# Patient Record
Sex: FEMALE | Race: WHITE | HISPANIC OR LATINO | Employment: FULL TIME | URBAN - METROPOLITAN AREA
[De-identification: names, ages, dates, MRNs, and addresses within clinical notes are randomized per-mention and may not be internally consistent; named-entity substitution may affect disease eponyms.]

---
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
## 2022-04-21 ENCOUNTER — HOSPITAL ENCOUNTER (OUTPATIENT)
Dept: LAB | Facility: MEDICAL CENTER | Age: 22
End: 2022-04-21
Attending: FAMILY MEDICINE
Payer: OTHER GOVERNMENT

## 2022-04-21 LAB — B-HCG SERPL-ACNC: <1 MIU/ML (ref 0–5)

## 2022-04-21 PROCEDURE — 36415 COLL VENOUS BLD VENIPUNCTURE: CPT

## 2022-04-21 PROCEDURE — 84702 CHORIONIC GONADOTROPIN TEST: CPT

## 2022-05-04 ENCOUNTER — HOSPITAL ENCOUNTER (EMERGENCY)
Facility: MEDICAL CENTER | Age: 22
End: 2022-05-04
Attending: STUDENT IN AN ORGANIZED HEALTH CARE EDUCATION/TRAINING PROGRAM
Payer: OTHER GOVERNMENT

## 2022-05-04 VITALS
RESPIRATION RATE: 16 BRPM | SYSTOLIC BLOOD PRESSURE: 127 MMHG | BODY MASS INDEX: 32.05 KG/M2 | DIASTOLIC BLOOD PRESSURE: 69 MMHG | TEMPERATURE: 97.9 F | OXYGEN SATURATION: 98 % | HEART RATE: 86 BPM | WEIGHT: 174.16 LBS | HEIGHT: 62 IN

## 2022-05-04 DIAGNOSIS — N93.9 VAGINAL BLEEDING: ICD-10-CM

## 2022-05-04 LAB
ALBUMIN SERPL BCP-MCNC: 4.6 G/DL (ref 3.2–4.9)
ALBUMIN/GLOB SERPL: 1.4 G/DL
ALP SERPL-CCNC: 86 U/L (ref 30–99)
ALT SERPL-CCNC: 11 U/L (ref 2–50)
ANION GAP SERPL CALC-SCNC: 11 MMOL/L (ref 7–16)
AST SERPL-CCNC: 12 U/L (ref 12–45)
BASOPHILS # BLD AUTO: 0.3 % (ref 0–1.8)
BASOPHILS # BLD: 0.02 K/UL (ref 0–0.12)
BILIRUB SERPL-MCNC: 0.3 MG/DL (ref 0.1–1.5)
BUN SERPL-MCNC: 12 MG/DL (ref 8–22)
CALCIUM SERPL-MCNC: 9.4 MG/DL (ref 8.5–10.5)
CHLORIDE SERPL-SCNC: 105 MMOL/L (ref 96–112)
CO2 SERPL-SCNC: 23 MMOL/L (ref 20–33)
CREAT SERPL-MCNC: 0.59 MG/DL (ref 0.5–1.4)
EOSINOPHIL # BLD AUTO: 0.07 K/UL (ref 0–0.51)
EOSINOPHIL NFR BLD: 0.9 % (ref 0–6.9)
ERYTHROCYTE [DISTWIDTH] IN BLOOD BY AUTOMATED COUNT: 42.3 FL (ref 35.9–50)
GFR SERPLBLD CREATININE-BSD FMLA CKD-EPI: 132 ML/MIN/1.73 M 2
GLOBULIN SER CALC-MCNC: 3.2 G/DL (ref 1.9–3.5)
GLUCOSE SERPL-MCNC: 92 MG/DL (ref 65–99)
HCG SERPL QL: NEGATIVE
HCT VFR BLD AUTO: 42.9 % (ref 37–47)
HGB BLD-MCNC: 13.7 G/DL (ref 12–16)
IMM GRANULOCYTES # BLD AUTO: 0.02 K/UL (ref 0–0.11)
IMM GRANULOCYTES NFR BLD AUTO: 0.3 % (ref 0–0.9)
LYMPHOCYTES # BLD AUTO: 2.62 K/UL (ref 1–4.8)
LYMPHOCYTES NFR BLD: 34.2 % (ref 22–41)
MCH RBC QN AUTO: 25.7 PG (ref 27–33)
MCHC RBC AUTO-ENTMCNC: 31.9 G/DL (ref 33.6–35)
MCV RBC AUTO: 80.5 FL (ref 81.4–97.8)
MONOCYTES # BLD AUTO: 0.5 K/UL (ref 0–0.85)
MONOCYTES NFR BLD AUTO: 6.5 % (ref 0–13.4)
NEUTROPHILS # BLD AUTO: 4.42 K/UL (ref 2–7.15)
NEUTROPHILS NFR BLD: 57.8 % (ref 44–72)
NRBC # BLD AUTO: 0 K/UL
NRBC BLD-RTO: 0 /100 WBC
PLATELET # BLD AUTO: 362 K/UL (ref 164–446)
PMV BLD AUTO: 9.2 FL (ref 9–12.9)
POTASSIUM SERPL-SCNC: 4.4 MMOL/L (ref 3.6–5.5)
PROT SERPL-MCNC: 7.8 G/DL (ref 6–8.2)
RBC # BLD AUTO: 5.33 M/UL (ref 4.2–5.4)
SODIUM SERPL-SCNC: 139 MMOL/L (ref 135–145)
WBC # BLD AUTO: 7.7 K/UL (ref 4.8–10.8)

## 2022-05-04 PROCEDURE — 99284 EMERGENCY DEPT VISIT MOD MDM: CPT

## 2022-05-04 PROCEDURE — 85025 COMPLETE CBC W/AUTO DIFF WBC: CPT

## 2022-05-04 PROCEDURE — 80053 COMPREHEN METABOLIC PANEL: CPT

## 2022-05-04 PROCEDURE — 84703 CHORIONIC GONADOTROPIN ASSAY: CPT

## 2022-05-04 PROCEDURE — 36415 COLL VENOUS BLD VENIPUNCTURE: CPT

## 2022-05-04 ASSESSMENT — FIBROSIS 4 INDEX: FIB4 SCORE: 0.22

## 2022-05-05 NOTE — ED TRIAGE NOTES
"Chief Complaint   Patient presents with   • Vaginal Bleeding     Pt had a positive at home pregnancy test today. Pt had spotting this morning.     /79   Pulse 92   Temp 36.7 °C (98.1 °F) (Temporal)   Resp 16   Ht 1.575 m (5' 2\")   Wt 79 kg (174 lb 2.6 oz)   SpO2 96%   BMI 31.85 kg/m²     "

## 2022-05-05 NOTE — ED PROVIDER NOTES
"CHIEF COMPLAINT  Chief Complaint   Patient presents with   • Vaginal Bleeding     Pt had a positive at home pregnancy test today. Pt had spotting this morning.       HPI  Constantin Bobo is a 21 y.o. female who presents for evaluation of vaginal spotting.  Patient states that her last menstrual cycle was March 15.  Today she noted some light vaginal spotting after wiping, took a home pregnancy test which was positive.  Stated that she has been pregnant 1 time before and did have bleeding throughout her pregnancy, so she presented to the emergency department for evaluation.  Denies any heavy vaginal bleeding.  Denies any pelvic pain.  Dizziness lightheadedness or syncopal episodes.  No concerns for STDs.  Denies any dysuria increased urinary frequency or hematuria.  REVIEW OF SYSTEMS  See HPI for further details. All other systems are negative.     PAST MEDICAL HISTORY   has a past medical history of Mild intermittent asthma (11/16/2015) and Myopia of both eyes (11/16/2015).    SOCIAL HISTORY  Social History     Tobacco Use   • Smoking status: Never Smoker   • Smokeless tobacco: Never Used   Vaping Use   • Vaping Use: Never used   Substance and Sexual Activity   • Alcohol use: No     Alcohol/week: 0.0 oz   • Drug use: No     Comment: marijuana last use 7/5/19   • Sexual activity: Never       SURGICAL HISTORY  patient denies any surgical history    CURRENT MEDICATIONS  Home Medications     Reviewed by Jeremy Peterson R.N. (Registered Nurse) on 05/04/22 at 0153  Med List Status: Partial   Medication Last Dose Status   APRI 0.15-30 MG-MCG per tablet  Active   famotidine (PEPCID) 20 MG Tab  Active   ondansetron (ZOFRAN ODT) 4 MG TABLET DISPERSIBLE  Active   sucralfate (CARAFATE) 1 GM Tab  Active                ALLERGIES  No Known Allergies    FAMILY HISTORY  No pertinent family history    PHYSICAL EXAM   /79   Pulse 92   Temp 36.7 °C (98.1 °F) (Temporal)   Resp 16   Ht 1.575 m (5' 2\")   Wt 79 kg (174 " lb 2.6 oz)   SpO2 96%   BMI 31.85 kg/m²  @[id number]::@   Pulse ox interpretation: I interpret this pulse ox as normal.  VITALS - vital signs documented prior to this note have been reviewed and noted,  GENERAL - awake, alert, oriented, GCS 15, no apparent distress, non-toxic  appearing  HEENT - normocephalic, atraumatic, pupils equal, sclera anicteric, mucus  membranes moist  NECK - supple, no meningismus, full active range of motion, trachea midline  CARDIOVASCULAR - regular rate/rhythm, no murmurs/gallops/rubs  PULMONARY - no respiratory distress, speaking in full sentences, clear to  auscultation bilaterally, no wheezing/ronchi/rales, no accessory muscle use  GASTROINTESTINAL - soft, non-tender, non-distended, no rebound, guarding,  or peritonitis  GENITOURINARY - patient declined pelvic exam  NEUROLOGIC - Awake alert, normal mental status, speech fluid, cognition  normal, moves all extremities  MUSCULOSKELETAL - no obvious asymmetry or deformities present  EXTREMITIES - warm, well-perfused, no cyanosis or significant edema  DERMATOLOGIC - warm, dry, no rashes, no jaundice  PSYCHIATRIC - normal affect, normal insight, normal concentration            LABS  Labs Reviewed   CBC WITH DIFFERENTIAL - Abnormal; Notable for the following components:       Result Value    MCV 80.5 (*)     MCH 25.7 (*)     MCHC 31.9 (*)     All other components within normal limits   COMP METABOLIC PANEL   HCG QUAL SERUM   ESTIMATED GFR       Results for orders placed or performed during the hospital encounter of 05/04/22   CBC WITH DIFFERENTIAL   Result Value Ref Range    WBC 7.7 4.8 - 10.8 K/uL    RBC 5.33 4.20 - 5.40 M/uL    Hemoglobin 13.7 12.0 - 16.0 g/dL    Hematocrit 42.9 37.0 - 47.0 %    MCV 80.5 (L) 81.4 - 97.8 fL    MCH 25.7 (L) 27.0 - 33.0 pg    MCHC 31.9 (L) 33.6 - 35.0 g/dL    RDW 42.3 35.9 - 50.0 fL    Platelet Count 362 164 - 446 K/uL    MPV 9.2 9.0 - 12.9 fL    Neutrophils-Polys 57.80 44.00 - 72.00 %    Lymphocytes 34.20  22.00 - 41.00 %    Monocytes 6.50 0.00 - 13.40 %    Eosinophils 0.90 0.00 - 6.90 %    Basophils 0.30 0.00 - 1.80 %    Immature Granulocytes 0.30 0.00 - 0.90 %    Nucleated RBC 0.00 /100 WBC    Neutrophils (Absolute) 4.42 2.00 - 7.15 K/uL    Lymphs (Absolute) 2.62 1.00 - 4.80 K/uL    Monos (Absolute) 0.50 0.00 - 0.85 K/uL    Eos (Absolute) 0.07 0.00 - 0.51 K/uL    Baso (Absolute) 0.02 0.00 - 0.12 K/uL    Immature Granulocytes (abs) 0.02 0.00 - 0.11 K/uL    NRBC (Absolute) 0.00 K/uL   COMP METABOLIC PANEL   Result Value Ref Range    Sodium 139 135 - 145 mmol/L    Potassium 4.4 3.6 - 5.5 mmol/L    Chloride 105 96 - 112 mmol/L    Co2 23 20 - 33 mmol/L    Anion Gap 11.0 7.0 - 16.0    Glucose 92 65 - 99 mg/dL    Bun 12 8 - 22 mg/dL    Creatinine 0.59 0.50 - 1.40 mg/dL    Calcium 9.4 8.5 - 10.5 mg/dL    AST(SGOT) 12 12 - 45 U/L    ALT(SGPT) 11 2 - 50 U/L    Alkaline Phosphatase 86 30 - 99 U/L    Total Bilirubin 0.3 0.1 - 1.5 mg/dL    Albumin 4.6 3.2 - 4.9 g/dL    Total Protein 7.8 6.0 - 8.2 g/dL    Globulin 3.2 1.9 - 3.5 g/dL    A-G Ratio 1.4 g/dL   HCG QUAL SERUM   Result Value Ref Range    Beta-Hcg Qualitative Serum Negative Negative   ESTIMATED GFR   Result Value Ref Range    GFR (CKD-EPI) 132 >60 mL/min/1.73 m 2         Pertinent Labs & Imaging studies reviewed. (See chart for details)    RADIOLOGY  No orders to display             ED COURSE             Medications - No data to display            MEDICAL DECISION MAKING    Patient presented for evaluation of concerns of her light vaginal spotting.  Differential initial included was not limited to threatened  and noted  missed  ectopic pregnancy, cervicitis, menorrhagia dysfunctional uterine bleeding among other considerations.  Labs were obtained were nonactionable hCG is negative.  Patient has no pelvic pain.  Given that her hCG is negative she has no pelvic pain do not see an indication for an emergent ultrasound at this time.  Patient  declined pelvic exam in the emergency department.  Given that she is otherwise hemodynamically stable her labs are nonactionable I do believe she is appropriate for for outpatient management.  Unclear exact etiology of her at home positive pregnancy test and negative hCG today thus I recommended close follow-up with OB gynecology.  All pertinent return precautions were discussed with the patient, and they expressed understanding.  Patient was discharged in a stable condition              FINAL IMPRESSION  1.  Dysfunctional uterine bleeding         Electronically signed by: Nicholas Dowell D.O., 5/4/2022 8:26 PM      Dictation Disclaimer  Please note this report has been produced using speech recognition software and  may contain errors related to that system, including errors seen in grammar,  punctuation and spelling, as well as words and phrases that may be inappropriate.  If there are any questions or concerns, please feel free to contact the dictating  physician for clarification.

## 2022-05-05 NOTE — DISCHARGE INSTRUCTIONS
If you develop any severe abdominal pain increased vaginal bleeding, going through more than 1 pad an hour for 3 hours in order return with a recheck, follow-up with OB gynecology.  Return with other concerns

## 2022-08-16 ENCOUNTER — APPOINTMENT (OUTPATIENT)
Dept: MEDICAL GROUP | Facility: MEDICAL CENTER | Age: 22
End: 2022-08-16
Payer: OTHER GOVERNMENT

## 2022-08-16 ENCOUNTER — OFFICE VISIT (OUTPATIENT)
Dept: URGENT CARE | Facility: CLINIC | Age: 22
End: 2022-08-16
Payer: OTHER GOVERNMENT

## 2022-08-16 VITALS
OXYGEN SATURATION: 98 % | TEMPERATURE: 98.3 F | SYSTOLIC BLOOD PRESSURE: 110 MMHG | HEART RATE: 68 BPM | WEIGHT: 180 LBS | BODY MASS INDEX: 33.13 KG/M2 | RESPIRATION RATE: 16 BRPM | DIASTOLIC BLOOD PRESSURE: 60 MMHG | HEIGHT: 62 IN

## 2022-08-16 DIAGNOSIS — N93.9 VAGINAL SPOTTING: ICD-10-CM

## 2022-08-16 PROCEDURE — 99213 OFFICE O/P EST LOW 20 MIN: CPT | Performed by: PHYSICIAN ASSISTANT

## 2022-08-16 PROCEDURE — 81025 URINE PREGNANCY TEST: CPT | Performed by: PHYSICIAN ASSISTANT

## 2022-08-16 ASSESSMENT — ENCOUNTER SYMPTOMS
HEADACHES: 0
NAUSEA: 0
VOMITING: 0
CHILLS: 0
ABDOMINAL PAIN: 0
DIZZINESS: 0
FEVER: 0

## 2022-08-16 ASSESSMENT — FIBROSIS 4 INDEX: FIB4 SCORE: 0.2

## 2022-08-16 NOTE — PROGRESS NOTES
"Subjective     Constantin Bobo is a 22 y.o. female who presents with Vaginal Bleeding (X 5 days)            The patient is here concerned with vaginal spotting. The patient had a miscarriage about 3 months ago. She reports having 2 regular periods. Her last period with 8/2/2022. She finished her cycle and noticed light spotting about 5 days ago. She states the bleeding is brown and does not saturate a panty-liner. She denies any vaginal discharge. She is sexually active and not concerned about STIs. She denies abnormal pelvic pain or cramping. She states she reached out to her PCP who advised her to be seen in the ED. The patient also reports recent weight gain and stress.     Past Medical History:   Diagnosis Date    Mild intermittent asthma 11/16/2015    Myopia of both eyes 11/16/2015     No past surgical history on file.    No family history on file.    No Known Allergies  Medications, Allergies, and current problem list reviewed today in Epic      Review of Systems   Constitutional:  Negative for chills, fever and malaise/fatigue.   Gastrointestinal:  Negative for abdominal pain, nausea and vomiting.   Genitourinary:         Vaginal spotting. No pelvic pain    Skin:  Negative for itching and rash.   Neurological:  Negative for dizziness and headaches.      All other systems reviewed and are negative.         Objective     /60 (BP Location: Left arm, Patient Position: Sitting, BP Cuff Size: Adult long)   Pulse 68   Temp 36.8 °C (98.3 °F) (Temporal)   Resp 16   Ht 1.575 m (5' 2\")   Wt 81.6 kg (180 lb)   LMP 08/01/2022   SpO2 98%   BMI 32.92 kg/m²      Physical Exam  Constitutional:       General: She is not in acute distress.     Appearance: She is not ill-appearing.   HENT:      Head: Normocephalic and atraumatic.   Eyes:      Conjunctiva/sclera: Conjunctivae normal.   Cardiovascular:      Rate and Rhythm: Normal rate and regular rhythm.   Pulmonary:      Effort: Pulmonary effort is normal. " No respiratory distress.      Breath sounds: No stridor. No wheezing.   Neurological:      General: No focal deficit present.      Mental Status: She is alert and oriented to person, place, and time.   Psychiatric:         Mood and Affect: Mood normal.         Behavior: Behavior normal.         Thought Content: Thought content normal.         Judgment: Judgment normal.                HCG- negative            Assessment & Plan        1. Vaginal spotting  POCT Pregnancy        Likely hormonal- recent miscarriage, stress and weight gain.  No red flags- no pelvic pain, heavy bleeding, discharge.  Recommend follow-up with Gynecology as planned in 3 weeks for more extensive work-up.  Differential diagnoses, Supportive care, and indications for immediate follow-up discussed with patient.   Pathogenesis of diagnosis discussed including typical length and natural progression.   Instructed to return to clinic or nearest emergency department for any change in condition, further concerns, or worsening of symptoms.      The patient demonstrated a good understanding and agreed with the treatment plan.      Huyen Hilton P.A.-C.

## 2022-08-16 NOTE — LETTER
August 16, 2022         Patient: Constantin Bobo   YOB: 2000   Date of Visit: 8/16/2022           To Whom it May Concern:    Constantin Bobo was seen in my clinic on 8/16/2022. She should be excused from work today for medical reasons.    If you have any questions or concerns, please don't hesitate to call.        Sincerely,           Huyen Hilton P.A.-C.  Electronically Signed

## 2022-08-17 LAB
INT CON NEG: NEGATIVE
INT CON POS: POSITIVE
POC URINE PREGNANCY TEST: NEGATIVE

## 2022-08-26 ENCOUNTER — APPOINTMENT (OUTPATIENT)
Dept: MEDICAL GROUP | Facility: MEDICAL CENTER | Age: 22
End: 2022-08-26
Payer: OTHER GOVERNMENT

## 2023-01-25 ENCOUNTER — TELEMEDICINE (OUTPATIENT)
Dept: MEDICAL GROUP | Facility: MEDICAL CENTER | Age: 23
End: 2023-01-25
Payer: OTHER GOVERNMENT

## 2023-01-25 VITALS — WEIGHT: 160 LBS | HEIGHT: 62 IN | BODY MASS INDEX: 29.44 KG/M2

## 2023-01-25 DIAGNOSIS — J01.01 ACUTE RECURRENT MAXILLARY SINUSITIS: ICD-10-CM

## 2023-01-25 PROBLEM — R09.81 SINUS CONGESTION: Status: ACTIVE | Noted: 2023-01-25

## 2023-01-25 PROCEDURE — 99213 OFFICE O/P EST LOW 20 MIN: CPT | Mod: 95 | Performed by: STUDENT IN AN ORGANIZED HEALTH CARE EDUCATION/TRAINING PROGRAM

## 2023-01-25 RX ORDER — ALBUTEROL SULFATE 90 UG/1
AEROSOL, METERED RESPIRATORY (INHALATION)
COMMUNITY
Start: 2022-12-13

## 2023-01-25 RX ORDER — AMOXICILLIN AND CLAVULANATE POTASSIUM 875; 125 MG/1; MG/1
1 TABLET, FILM COATED ORAL 2 TIMES DAILY
Qty: 14 TABLET | Refills: 0 | Status: SHIPPED | OUTPATIENT
Start: 2023-01-25 | End: 2023-02-01

## 2023-01-25 ASSESSMENT — FIBROSIS 4 INDEX: FIB4 SCORE: 0.2

## 2023-01-25 NOTE — PROGRESS NOTES
"Virtual Visit: Established Patient   This visit was conducted via Zoom using secure and encrypted videoconferencing technology.   The patient was in their home in the [facility].    The patient's identity was confirmed and verbal consent was obtained for this virtual visit.    Subjective:   CC:   Chief Complaint   Patient presents with    Sinus Problem     Congestion, sneezing, watery eyes     Constantin Bobo is a 22 y.o. female presenting for evaluation and management of:    Problem   Acute Recurrent Maxillary Sinusitis    Acute condition. She reports she does get sinus infections regularly. She has taken and tolerated amoxicillin in the past.    Date of symptoms onset: several days  Symptoms: sinus tenderness, congested, runny nose  Denies fever, cough, chest pain, shortness of breath.  Date of COVID positive test:  Medications tried: OTC sinus rinsing and Afrin with minimal relief  Home max temperature: no fever         ROS   See HPI    Current medicines (including changes today)  Current Outpatient Medications   Medication Sig Dispense Refill    amoxicillin-clavulanate (AUGMENTIN) 875-125 MG Tab Take 1 Tablet by mouth 2 times a day for 7 days. 14 Tablet 0    albuterol 108 (90 Base) MCG/ACT Aero Soln inhalation aerosol INHALE 2 PUFFS BY MOUTH TWICE A DAY AS NEEDED FOR WHEEZING       No current facility-administered medications for this visit.       Patient Active Problem List    Diagnosis Date Noted    Acute recurrent maxillary sinusitis 01/25/2023    Depression 09/30/2020    Microcytic anemia 09/30/2020    IUP (intrauterine pregnancy), incidental 12/18/2019    Mild intermittent asthma 11/16/2015    Myopia of both eyes 11/16/2015        Objective:   Ht 1.575 m (5' 2\")   Wt 72.6 kg (160 lb) Comment: pt stated  BMI 29.26 kg/m²     Physical Exam: limited exam due to virtual visit  Constitutional: Alert, no distress, well-groomed.  Respiratory: Unlabored respiratory effort  Neuro: Alert and oriented " x3, normal conversation.     Assessment and Plan:   The following treatment plan was discussed:     1. Acute recurrent maxillary sinusitis  Acute condition, persistent. Rx Augmentin per order.  - cont symptomatic therapy as needed: OTC ibuprofen as needed for pain/headache/fever, antihistamine/decongestant as needed for runny nose/congestion. Call or return to clinic prn if these symptoms worsen or fail to improve as anticipated.  - cont nasal saline rinsing daily  - amoxicillin-clavulanate (AUGMENTIN) 875-125 MG Tab; Take 1 Tablet by mouth 2 times a day for 7 days.  Dispense: 14 Tablet; Refill: 0      Follow-up: Return if symptoms worsen or fail to improve.

## 2023-01-26 DIAGNOSIS — J01.01 ACUTE RECURRENT MAXILLARY SINUSITIS: ICD-10-CM

## 2023-01-26 RX ORDER — DOXYCYCLINE HYCLATE 100 MG
100 TABLET ORAL 2 TIMES DAILY
Qty: 14 TABLET | Refills: 0 | Status: SHIPPED | OUTPATIENT
Start: 2023-01-26 | End: 2023-02-02

## 2023-06-05 ENCOUNTER — HOSPITAL ENCOUNTER (EMERGENCY)
Facility: MEDICAL CENTER | Age: 23
End: 2023-06-05
Attending: EMERGENCY MEDICINE
Payer: OTHER GOVERNMENT

## 2023-06-05 ENCOUNTER — APPOINTMENT (OUTPATIENT)
Dept: RADIOLOGY | Facility: MEDICAL CENTER | Age: 23
End: 2023-06-05
Attending: EMERGENCY MEDICINE
Payer: OTHER GOVERNMENT

## 2023-06-05 VITALS
RESPIRATION RATE: 16 BRPM | HEIGHT: 62 IN | DIASTOLIC BLOOD PRESSURE: 57 MMHG | HEART RATE: 85 BPM | SYSTOLIC BLOOD PRESSURE: 100 MMHG | OXYGEN SATURATION: 97 % | BODY MASS INDEX: 35.05 KG/M2 | WEIGHT: 190.48 LBS | TEMPERATURE: 97.5 F

## 2023-06-05 DIAGNOSIS — O20.9 VAGINAL BLEEDING IN PREGNANCY, FIRST TRIMESTER: ICD-10-CM

## 2023-06-05 DIAGNOSIS — O20.0 THREATENED MISCARRIAGE: ICD-10-CM

## 2023-06-05 LAB
ALBUMIN SERPL BCP-MCNC: 4.3 G/DL (ref 3.2–4.9)
ALBUMIN/GLOB SERPL: 1.5 G/DL
ALP SERPL-CCNC: 66 U/L (ref 30–99)
ALT SERPL-CCNC: 22 U/L (ref 2–50)
ANION GAP SERPL CALC-SCNC: 13 MMOL/L (ref 7–16)
APPEARANCE UR: ABNORMAL
AST SERPL-CCNC: 20 U/L (ref 12–45)
B-HCG SERPL-ACNC: ABNORMAL MIU/ML (ref 0–5)
BACTERIA #/AREA URNS HPF: ABNORMAL /HPF
BASOPHILS # BLD AUTO: 0.2 % (ref 0–1.8)
BASOPHILS # BLD: 0.02 K/UL (ref 0–0.12)
BILIRUB SERPL-MCNC: 0.4 MG/DL (ref 0.1–1.5)
BILIRUB UR QL STRIP.AUTO: NEGATIVE
BUN SERPL-MCNC: 8 MG/DL (ref 8–22)
CALCIUM ALBUM COR SERPL-MCNC: 8.9 MG/DL (ref 8.5–10.5)
CALCIUM SERPL-MCNC: 9.1 MG/DL (ref 8.5–10.5)
CHLORIDE SERPL-SCNC: 108 MMOL/L (ref 96–112)
CO2 SERPL-SCNC: 20 MMOL/L (ref 20–33)
COLOR UR: ABNORMAL
CREAT SERPL-MCNC: 0.68 MG/DL (ref 0.5–1.4)
EOSINOPHIL # BLD AUTO: 0.18 K/UL (ref 0–0.51)
EOSINOPHIL NFR BLD: 2.2 % (ref 0–6.9)
EPI CELLS #/AREA URNS HPF: ABNORMAL /HPF
ERYTHROCYTE [DISTWIDTH] IN BLOOD BY AUTOMATED COUNT: 38.5 FL (ref 35.9–50)
GFR SERPLBLD CREATININE-BSD FMLA CKD-EPI: 127 ML/MIN/1.73 M 2
GLOBULIN SER CALC-MCNC: 2.8 G/DL (ref 1.9–3.5)
GLUCOSE SERPL-MCNC: 89 MG/DL (ref 65–99)
GLUCOSE UR STRIP.AUTO-MCNC: NEGATIVE MG/DL
HCG SERPL QL: POSITIVE
HCT VFR BLD AUTO: 43 % (ref 37–47)
HGB BLD-MCNC: 13.9 G/DL (ref 12–16)
HYALINE CASTS #/AREA URNS LPF: ABNORMAL /LPF
IMM GRANULOCYTES # BLD AUTO: 0.02 K/UL (ref 0–0.11)
IMM GRANULOCYTES NFR BLD AUTO: 0.2 % (ref 0–0.9)
KETONES UR STRIP.AUTO-MCNC: 40 MG/DL
LEUKOCYTE ESTERASE UR QL STRIP.AUTO: ABNORMAL
LIPASE SERPL-CCNC: 27 U/L (ref 11–82)
LYMPHOCYTES # BLD AUTO: 2.24 K/UL (ref 1–4.8)
LYMPHOCYTES NFR BLD: 27.2 % (ref 22–41)
MCH RBC QN AUTO: 26.6 PG (ref 27–33)
MCHC RBC AUTO-ENTMCNC: 32.3 G/DL (ref 32.2–35.5)
MCV RBC AUTO: 82.2 FL (ref 81.4–97.8)
MICRO URNS: ABNORMAL
MONOCYTES # BLD AUTO: 0.57 K/UL (ref 0–0.85)
MONOCYTES NFR BLD AUTO: 6.9 % (ref 0–13.4)
NEUTROPHILS # BLD AUTO: 5.2 K/UL (ref 1.82–7.42)
NEUTROPHILS NFR BLD: 63.3 % (ref 44–72)
NITRITE UR QL STRIP.AUTO: NEGATIVE
NRBC # BLD AUTO: 0 K/UL
NRBC BLD-RTO: 0 /100 WBC (ref 0–0.2)
PH UR STRIP.AUTO: 6 [PH] (ref 5–8)
PLATELET # BLD AUTO: 301 K/UL (ref 164–446)
PMV BLD AUTO: 9.6 FL (ref 9–12.9)
POTASSIUM SERPL-SCNC: 4.1 MMOL/L (ref 3.6–5.5)
PROT SERPL-MCNC: 7.1 G/DL (ref 6–8.2)
PROT UR QL STRIP: 30 MG/DL
RBC # BLD AUTO: 5.23 M/UL (ref 4.2–5.4)
RBC # URNS HPF: ABNORMAL /HPF
RBC UR QL AUTO: NEGATIVE
SODIUM SERPL-SCNC: 141 MMOL/L (ref 135–145)
SP GR UR STRIP.AUTO: 1.02
UROBILINOGEN UR STRIP.AUTO-MCNC: 1 MG/DL
WBC # BLD AUTO: 8.2 K/UL (ref 4.8–10.8)
WBC #/AREA URNS HPF: ABNORMAL /HPF

## 2023-06-05 PROCEDURE — 99284 EMERGENCY DEPT VISIT MOD MDM: CPT

## 2023-06-05 PROCEDURE — 81001 URINALYSIS AUTO W/SCOPE: CPT

## 2023-06-05 PROCEDURE — 76801 OB US < 14 WKS SINGLE FETUS: CPT

## 2023-06-05 PROCEDURE — 84703 CHORIONIC GONADOTROPIN ASSAY: CPT

## 2023-06-05 PROCEDURE — 36415 COLL VENOUS BLD VENIPUNCTURE: CPT

## 2023-06-05 PROCEDURE — 85025 COMPLETE CBC W/AUTO DIFF WBC: CPT

## 2023-06-05 PROCEDURE — 84702 CHORIONIC GONADOTROPIN TEST: CPT

## 2023-06-05 PROCEDURE — 83690 ASSAY OF LIPASE: CPT

## 2023-06-05 PROCEDURE — 80053 COMPREHEN METABOLIC PANEL: CPT

## 2023-06-05 ASSESSMENT — LIFESTYLE VARIABLES
DOES PATIENT WANT TO STOP DRINKING: NO
EVER FELT BAD OR GUILTY ABOUT YOUR DRINKING: NO
TOTAL SCORE: 0
TOTAL SCORE: 0
EVER HAD A DRINK FIRST THING IN THE MORNING TO STEADY YOUR NERVES TO GET RID OF A HANGOVER: NO
DO YOU DRINK ALCOHOL: NO
HAVE PEOPLE ANNOYED YOU BY CRITICIZING YOUR DRINKING: NO
HAVE YOU EVER FELT YOU SHOULD CUT DOWN ON YOUR DRINKING: NO
CONSUMPTION TOTAL: INCOMPLETE
TOTAL SCORE: 0

## 2023-06-05 ASSESSMENT — FIBROSIS 4 INDEX: FIB4 SCORE: 0.2

## 2023-06-05 ASSESSMENT — PAIN DESCRIPTION - PAIN TYPE: TYPE: ACUTE PAIN

## 2023-06-05 NOTE — ED NOTES
Assumed care of pt, pt resting without complaint at this time, friend at bedside. Pt pt updated with plan of care, waiting for U/S

## 2023-06-05 NOTE — ED NOTES
.Patient discharged in stable condition per orders. Wristband removed per protocol. Patient verbalized understanding of all discharge instructions. All belongings accounted for.

## 2023-06-05 NOTE — ED TRIAGE NOTES
22 yr patient walked in to triage for the above complaint. NAD per patient the bleeding started yesterday and she has had a miscarriage in the past and doesn't know if she is pregnant at this time. Explained the triage process and to inform staff if symptoms get worse.

## 2023-06-05 NOTE — ED PROVIDER NOTES
"  ER Provider Note    Scribed for Julio Marquez M.D. by Maria Eugenia Guo. 6/5/2023   6:39 AM    Primary Care Provider: Lottie Marshall M.D.    CHIEF COMPLAINT  Chief Complaint   Patient presents with    Vaginal Bleeding     Started this morning     EXTERNAL RECORDS REVIEWED  Outpatient Notes indicates that the patient was seen for vaginal bleeding and similar symptoms. and Outpatient labs & studies indicates the patient is Rh positive, so rhogam is not indicated.    HPI/ROS  LIMITATION TO HISTORY   Select: : None  OUTSIDE HISTORIAN(S):  Significant other at bedside to confirm sequence of events and collateral information as detailed below.    Constantin Bobo is a 22 y.o. female who presents to the ED for evaluation of vaginal bleeding onset yesterday. The patient states she also has associated abdominal cramping, back pain, and morning sickness, but denies any pain with urination. She reports she took a pregnancy a month ago and the result was positive. She adds that she was seen here a year ago for similar symptoms. No alleviating or exacerbating factors were noted.    PAST MEDICAL HISTORY  Past Medical History:   Diagnosis Date    Mild intermittent asthma 11/16/2015    Myopia of both eyes 11/16/2015     SURGICAL HISTORY  No past surgical history noted.    FAMILY HISTORY  No family history noted.    SOCIAL HISTORY   reports that she has never smoked. She has never used smokeless tobacco. She reports that she does not drink alcohol and does not use drugs.    CURRENT MEDICATIONS  Previous Medications    ALBUTEROL 108 (90 BASE) MCG/ACT AERO SOLN INHALATION AEROSOL    INHALE 2 PUFFS BY MOUTH TWICE A DAY AS NEEDED FOR WHEEZING     ALLERGIES  Allergies   Allergen Reactions    Seasonal Anxiety, Cough, Itching, Runny Nose and Shortness of Breath      PHYSICAL EXAM  /81   Pulse 97   Temp 36.4 °C (97.5 °F) (Temporal)   Resp 16   Ht 1.575 m (5' 2\")   Wt 86.4 kg (190 lb 7.6 oz)   LMP 04/14/2023 (Exact Date) "   SpO2 98%   BMI 34.84 kg/m²    Nursing note and vitals reviewed.  Constitutional: Well-developed and well-nourished. No distress.   HENT: Head is normocephalic and atraumatic. Oropharynx is clear and moist without exudate or erythema.   Eyes: Pupils are equal, round, and reactive to light. Conjunctiva are normal.   Cardiovascular: Normal rate and regular rhythm. No murmur heard. Normal radial pulses.  Pulmonary/Chest: Breath sounds normal. No wheezes or rales.   Abdominal: Soft, non-tender and non-distended. Normal active bowel sounds. No guarding or peritoneal signs. No palpable abdominal aortic aneurysm. No masses. No tenderness at McBurney's point.   Musculoskeletal: Extremities exhibit normal range of motion without edema or tenderness.   Neurological: Awake, alert and oriented to person, place, and time. No focal deficits noted.  Skin: Skin is warm and dry. No rash.  Psychiatric: Normal mood and affect. Appropriate for clinical situation    DIAGNOSTIC STUDIES    Labs:   Results for orders placed or performed during the hospital encounter of 06/05/23   CBC WITH DIFFERENTIAL   Result Value Ref Range    WBC 8.2 4.8 - 10.8 K/uL    RBC 5.23 4.20 - 5.40 M/uL    Hemoglobin 13.9 12.0 - 16.0 g/dL    Hematocrit 43.0 37.0 - 47.0 %    MCV 82.2 81.4 - 97.8 fL    MCH 26.6 (L) 27.0 - 33.0 pg    MCHC 32.3 32.2 - 35.5 g/dL    RDW 38.5 35.9 - 50.0 fL    Platelet Count 301 164 - 446 K/uL    MPV 9.6 9.0 - 12.9 fL    Neutrophils-Polys 63.30 44.00 - 72.00 %    Lymphocytes 27.20 22.00 - 41.00 %    Monocytes 6.90 0.00 - 13.40 %    Eosinophils 2.20 0.00 - 6.90 %    Basophils 0.20 0.00 - 1.80 %    Immature Granulocytes 0.20 0.00 - 0.90 %    Nucleated RBC 0.00 0.00 - 0.20 /100 WBC    Neutrophils (Absolute) 5.20 1.82 - 7.42 K/uL    Lymphs (Absolute) 2.24 1.00 - 4.80 K/uL    Monos (Absolute) 0.57 0.00 - 0.85 K/uL    Eos (Absolute) 0.18 0.00 - 0.51 K/uL    Baso (Absolute) 0.02 0.00 - 0.12 K/uL    Immature Granulocytes (abs) 0.02 0.00 -  0.11 K/uL    NRBC (Absolute) 0.00 K/uL   COMP METABOLIC PANEL   Result Value Ref Range    Sodium 141 135 - 145 mmol/L    Potassium 4.1 3.6 - 5.5 mmol/L    Chloride 108 96 - 112 mmol/L    Co2 20 20 - 33 mmol/L    Anion Gap 13.0 7.0 - 16.0    Glucose 89 65 - 99 mg/dL    Bun 8 8 - 22 mg/dL    Creatinine 0.68 0.50 - 1.40 mg/dL    Calcium 9.1 8.5 - 10.5 mg/dL    AST(SGOT) 20 12 - 45 U/L    ALT(SGPT) 22 2 - 50 U/L    Alkaline Phosphatase 66 30 - 99 U/L    Total Bilirubin 0.4 0.1 - 1.5 mg/dL    Albumin 4.3 3.2 - 4.9 g/dL    Total Protein 7.1 6.0 - 8.2 g/dL    Globulin 2.8 1.9 - 3.5 g/dL    A-G Ratio 1.5 g/dL   LIPASE   Result Value Ref Range    Lipase 27 11 - 82 U/L   HCG QUAL SERUM   Result Value Ref Range    Beta-Hcg Qualitative Serum Positive (A) Negative   URINALYSIS,CULTURE IF INDICATED    Specimen: Urine   Result Value Ref Range    Color DK Yellow     Character Cloudy (A)     Specific Gravity 1.025 <1.035    Ph 6.0 5.0 - 8.0    Glucose Negative Negative mg/dL    Ketones 40 (A) Negative mg/dL    Protein 30 (A) Negative mg/dL    Bilirubin Negative Negative    Urobilinogen, Urine 1.0 Negative    Nitrite Negative Negative    Leukocyte Esterase Moderate (A) Negative    Occult Blood Negative Negative    Micro Urine Req Microscopic    CORRECTED CALCIUM   Result Value Ref Range    Correct Calcium 8.9 8.5 - 10.5 mg/dL   ESTIMATED GFR   Result Value Ref Range    GFR (CKD-EPI) 127 >60 mL/min/1.73 m 2   URINE MICROSCOPIC (W/UA)   Result Value Ref Range    WBC 5-10 (A) /hpf    RBC 0-2 /hpf    Bacteria Moderate (A) None /hpf    Epithelial Cells Moderate (A) /hpf    Hyaline Cast 0-2 /lpf   HCG QUANTITATIVE SERUM   Result Value Ref Range    Bhcg 16827.0 (H) 0.0 - 5.0 mIU/mL     Radiology:   This attending emergency physician has independently interpreted the diagnostic imaging associated with this visit and is awaiting the final reading from the radiologist.   Preliminary interpretation is a follows: Ultrasound demonstrates single  live intrauterine pregnancy  Radiologist interpretation:   US-OB 1ST TRIMESTER WITH TRANSVAGINAL (COMBO)   Final Result      1.  Single living intrauterine pregnancy at 6 weeks, 3 days estimated gestational age.         INITIAL ASSESSMENT AND PLAN    6:39 AM - Patient was evaluated at bedside. Ordered for CBC w/ diff, CMP, lipase, HCG qual serum, and urinalysis to evaluate. Patient verbalizes understanding and support with my plan of care.  Differential diagnoses include but not limited to: miscarriage, threatened miscarriage, ectopic pregnancy and menses.    7:14 AM - Per nursing, the patient's pregnancy test today is positive. Ordered for US-OB 1st Trimester w/ Transvaginal and HCG Quant to evaluate. Review of electronic medical record indicates the patient is Rh positive, so rhogam is not indicated.    9:19 AM - Patient was reevaluated at bedside. Discussed lab and radiology results with the patient and informed them that she has a viable pregnancy currently at 6 weeks, but we cannot rule out miscarriage or threatened miscarriage. I referred the patient to gynecology. Patient will now be discharged at this time. Discussed return precautions and plan for at home care. Patient verbalizes understanding and agreement to this plan of care.      ED Observation Status? Yes; I am placing the patient in to an observation status due to a diagnostic uncertainty as well as therapeutic intensity. Patient placed in observation status at 6:44 AM, 6/5/2023.     Observation plan is as follows: Obtain labs, ultrasound, and monitor for signs of hemorrhage.    Upon Reevaluation, the patient's condition has: Improved; and will be discharged.    Patient discharged from ED Observation status at 9:19 AM (Time) 6/5/2023 (Date).         DISPOSITION AND DISCUSSIONS    I have discussed management of the patient with the following physicians and YARIEL's:  None    Discussion of management with other QHP or appropriate source(s): None      Escalation of care considered, and ultimately not performed: acute inpatient care management, however at this time, the patient is most appropriate for outpatient management.  However, the patient does not have evidence of an ectopic pregnancy and therefore is appropriate for discharge with outpatient follow-up.    Barriers to care at this time, including but not limited to:  None .     Decision tools and prescription drugs considered including, but not limited to:  None .    DISPOSITION:  Patient will be discharged home in stable condition.    FOLLOW UP:  Lottie Marshall M.D.  [address] [facility] [id number] [phone]    Schedule an appointment as soon as possible for a visit       [facility], Emergency Dept  [address] [facility] [address] [phone]    If symptoms worsen    Pregnancy Skylar Peterson M.D.  [address] [address]  [phone]    Schedule an appointment as soon as possible for a visit       FINAL DIANGOSIS  1. Threatened miscarriage    2. Vaginal bleeding in pregnancy, first trimester       Maria Eugenia REYES (Anaibapril), am scribing for, and in the presence of, Julio Marquez M.D..    Electronically signed by: Maria Eugenia Guo (Vitaliy), 6/5/2023    Julio REYES M.D. personally performed the services described in this documentation, as scribed by Maria Eugenia Guo in my presence, and it is both accurate and complete.      The note accurately reflects work and decisions made by me.  Julio Marquez M.D.  6/5/2023  1:18 PM

## 2023-08-04 ENCOUNTER — HOSPITAL ENCOUNTER (OUTPATIENT)
Facility: MEDICAL CENTER | Age: 23
End: 2023-08-04
Attending: OBSTETRICS & GYNECOLOGY
Payer: COMMERCIAL

## 2023-08-04 ENCOUNTER — HOSPITAL ENCOUNTER (OUTPATIENT)
Dept: LAB | Facility: MEDICAL CENTER | Age: 23
End: 2023-08-04
Attending: OBSTETRICS & GYNECOLOGY
Payer: COMMERCIAL

## 2023-08-04 LAB
AMBIGUOUS DTTM AMBI4: NORMAL
AMBIGUOUS DTTM AMBI4: NORMAL
APPEARANCE UR: CLEAR
BACTERIA #/AREA URNS HPF: ABNORMAL /HPF
BASOPHILS # BLD AUTO: 0.3 % (ref 0–1.8)
BASOPHILS # BLD: 0.02 K/UL (ref 0–0.12)
BILIRUB UR QL STRIP.AUTO: NEGATIVE
CAOX CRY #/AREA URNS HPF: ABNORMAL /HPF
COLOR UR: YELLOW
EOSINOPHIL # BLD AUTO: 0.1 K/UL (ref 0–0.51)
EOSINOPHIL NFR BLD: 1.4 % (ref 0–6.9)
EPI CELLS #/AREA URNS HPF: ABNORMAL /HPF
ERYTHROCYTE [DISTWIDTH] IN BLOOD BY AUTOMATED COUNT: 40.1 FL (ref 35.9–50)
GLUCOSE UR STRIP.AUTO-MCNC: NEGATIVE MG/DL
HBV SURFACE AG SER QL: ABNORMAL
HCT VFR BLD AUTO: 37.8 % (ref 37–47)
HCV AB SER QL: NORMAL
HGB BLD-MCNC: 12.2 G/DL (ref 12–16)
HIV 1+2 AB+HIV1 P24 AG SERPL QL IA: NORMAL
HYALINE CASTS #/AREA URNS LPF: ABNORMAL /LPF
IMM GRANULOCYTES # BLD AUTO: 0.04 K/UL (ref 0–0.11)
IMM GRANULOCYTES NFR BLD AUTO: 0.5 % (ref 0–0.9)
KETONES UR STRIP.AUTO-MCNC: ABNORMAL MG/DL
LEUKOCYTE ESTERASE UR QL STRIP.AUTO: ABNORMAL
LYMPHOCYTES # BLD AUTO: 1.71 K/UL (ref 1–4.8)
LYMPHOCYTES NFR BLD: 23.1 % (ref 22–41)
MCH RBC QN AUTO: 27.1 PG (ref 27–33)
MCHC RBC AUTO-ENTMCNC: 32.3 G/DL (ref 32.2–35.5)
MCV RBC AUTO: 83.8 FL (ref 81.4–97.8)
MICRO URNS: ABNORMAL
MONOCYTES # BLD AUTO: 0.49 K/UL (ref 0–0.85)
MONOCYTES NFR BLD AUTO: 6.6 % (ref 0–13.4)
MUCOUS THREADS #/AREA URNS HPF: ABNORMAL /HPF
NEUTROPHILS # BLD AUTO: 5.04 K/UL (ref 1.82–7.42)
NEUTROPHILS NFR BLD: 68.1 % (ref 44–72)
NITRITE UR QL STRIP.AUTO: NEGATIVE
NRBC # BLD AUTO: 0 K/UL
NRBC BLD-RTO: 0 /100 WBC (ref 0–0.2)
PH UR STRIP.AUTO: 6.5 [PH] (ref 5–8)
PLATELET # BLD AUTO: 244 K/UL (ref 164–446)
PMV BLD AUTO: 10.7 FL (ref 9–12.9)
PROT UR QL STRIP: NEGATIVE MG/DL
RBC # BLD AUTO: 4.51 M/UL (ref 4.2–5.4)
RBC # URNS HPF: ABNORMAL /HPF
RBC UR QL AUTO: NEGATIVE
RUBV AB SER QL: 1.51 IU/ML
SP GR UR STRIP.AUTO: 1.02
T PALLIDUM AB SER QL IA: ABNORMAL
UROBILINOGEN UR STRIP.AUTO-MCNC: 1 MG/DL
WBC # BLD AUTO: 7.4 K/UL (ref 4.8–10.8)
WBC #/AREA URNS HPF: ABNORMAL /HPF

## 2023-08-04 PROCEDURE — 86901 BLOOD TYPING SEROLOGIC RH(D): CPT

## 2023-08-04 PROCEDURE — 81420 FETAL CHRMOML ANEUPLOIDY: CPT

## 2023-08-04 PROCEDURE — 85025 COMPLETE CBC W/AUTO DIFF WBC: CPT

## 2023-08-04 PROCEDURE — 81001 URINALYSIS AUTO W/SCOPE: CPT

## 2023-08-04 PROCEDURE — 86762 RUBELLA ANTIBODY: CPT

## 2023-08-04 PROCEDURE — 87389 HIV-1 AG W/HIV-1&-2 AB AG IA: CPT

## 2023-08-04 PROCEDURE — 86592 SYPHILIS TEST NON-TREP QUAL: CPT

## 2023-08-04 PROCEDURE — 86780 TREPONEMA PALLIDUM: CPT

## 2023-08-04 PROCEDURE — 36415 COLL VENOUS BLD VENIPUNCTURE: CPT

## 2023-08-04 PROCEDURE — 86850 RBC ANTIBODY SCREEN: CPT

## 2023-08-04 PROCEDURE — 87340 HEPATITIS B SURFACE AG IA: CPT

## 2023-08-04 PROCEDURE — 86900 BLOOD TYPING SEROLOGIC ABO: CPT

## 2023-08-04 PROCEDURE — 86803 HEPATITIS C AB TEST: CPT

## 2023-08-05 LAB
ABO GROUP BLD: NORMAL
BLD GP AB SCN SERPL QL: NORMAL
RH BLD: NORMAL

## 2023-08-12 LAB
ANNOTATION COMMENT IMP: NORMAL
FET CHR X + Y ANEUP RISK PLAS.CFDNA QN: NORMAL
FET SEX US: NORMAL
FET TS 13 RISK PLAS.CFDNA QL: NORMAL
FET TS 18 RISK PLAS.CFDNA QL: NORMAL
FET TS 21 RISK PLAS.CFDNA QL: NORMAL
FETAL ANEUPLOIDY - TRIPLOIDY NV11651: NORMAL
FETAL FRACTION Q0204: 4.3 %
GA (DAYS): 2 D
GA (WEEKS): 15 WK
NUMBER OF FETUSES Q0671: 1
PATHOLOGY STUDY: NORMAL
REPORT FETAL SEX NL11652: YES

## 2023-09-29 ENCOUNTER — HOSPITAL ENCOUNTER (EMERGENCY)
Facility: MEDICAL CENTER | Age: 23
End: 2023-09-29
Attending: OBSTETRICS & GYNECOLOGY | Admitting: OBSTETRICS & GYNECOLOGY
Payer: COMMERCIAL

## 2023-09-29 VITALS
DIASTOLIC BLOOD PRESSURE: 63 MMHG | HEART RATE: 94 BPM | TEMPERATURE: 98.6 F | OXYGEN SATURATION: 96 % | RESPIRATION RATE: 18 BRPM | WEIGHT: 189 LBS | BODY MASS INDEX: 34.78 KG/M2 | HEIGHT: 62 IN | SYSTOLIC BLOOD PRESSURE: 108 MMHG

## 2023-09-29 DIAGNOSIS — D50.8 OTHER IRON DEFICIENCY ANEMIA: ICD-10-CM

## 2023-09-29 LAB
ALBUMIN SERPL BCP-MCNC: 3.6 G/DL (ref 3.2–4.9)
ALBUMIN/GLOB SERPL: 1.2 G/DL
ALP SERPL-CCNC: 73 U/L (ref 30–99)
ALT SERPL-CCNC: 8 U/L (ref 2–50)
ANION GAP SERPL CALC-SCNC: 11 MMOL/L (ref 7–16)
APPEARANCE UR: CLEAR
AST SERPL-CCNC: 13 U/L (ref 12–45)
BASOPHILS # BLD AUTO: 0.4 % (ref 0–1.8)
BASOPHILS # BLD: 0.03 K/UL (ref 0–0.12)
BILIRUB SERPL-MCNC: 0.3 MG/DL (ref 0.1–1.5)
BUN SERPL-MCNC: 8 MG/DL (ref 8–22)
CALCIUM ALBUM COR SERPL-MCNC: 9.3 MG/DL (ref 8.5–10.5)
CALCIUM SERPL-MCNC: 9 MG/DL (ref 8.5–10.5)
CHLORIDE SERPL-SCNC: 109 MMOL/L (ref 96–112)
CO2 SERPL-SCNC: 20 MMOL/L (ref 20–33)
COLOR UR AUTO: YELLOW
CREAT SERPL-MCNC: 0.47 MG/DL (ref 0.5–1.4)
EOSINOPHIL # BLD AUTO: 0.07 K/UL (ref 0–0.51)
EOSINOPHIL NFR BLD: 0.8 % (ref 0–6.9)
ERYTHROCYTE [DISTWIDTH] IN BLOOD BY AUTOMATED COUNT: 38.5 FL (ref 35.9–50)
FERRITIN SERPL-MCNC: 4.1 NG/ML (ref 10–291)
GFR SERPLBLD CREATININE-BSD FMLA CKD-EPI: 139 ML/MIN/1.73 M 2
GLOBULIN SER CALC-MCNC: 3 G/DL (ref 1.9–3.5)
GLUCOSE SERPL-MCNC: 87 MG/DL (ref 65–99)
GLUCOSE UR QL STRIP.AUTO: NEGATIVE MG/DL
HCT VFR BLD AUTO: 34.9 % (ref 37–47)
HGB BLD-MCNC: 11.3 G/DL (ref 12–16)
IMM GRANULOCYTES # BLD AUTO: 0.07 K/UL (ref 0–0.11)
IMM GRANULOCYTES NFR BLD AUTO: 0.8 % (ref 0–0.9)
KETONES UR QL STRIP.AUTO: 40 MG/DL
LEUKOCYTE ESTERASE UR QL STRIP.AUTO: ABNORMAL
LYMPHOCYTES # BLD AUTO: 1.75 K/UL (ref 1–4.8)
LYMPHOCYTES NFR BLD: 20.5 % (ref 22–41)
MCH RBC QN AUTO: 25.6 PG (ref 27–33)
MCHC RBC AUTO-ENTMCNC: 32.4 G/DL (ref 32.2–35.5)
MCV RBC AUTO: 79.1 FL (ref 81.4–97.8)
MONOCYTES # BLD AUTO: 0.46 K/UL (ref 0–0.85)
MONOCYTES NFR BLD AUTO: 5.4 % (ref 0–13.4)
NEUTROPHILS # BLD AUTO: 6.14 K/UL (ref 1.82–7.42)
NEUTROPHILS NFR BLD: 72.1 % (ref 44–72)
NITRITE UR QL STRIP.AUTO: NEGATIVE
NRBC # BLD AUTO: 0.02 K/UL
NRBC BLD-RTO: 0.2 /100 WBC (ref 0–0.2)
PH UR STRIP.AUTO: 7 [PH] (ref 5–8)
PLATELET # BLD AUTO: 243 K/UL (ref 164–446)
PMV BLD AUTO: 10.1 FL (ref 9–12.9)
POTASSIUM SERPL-SCNC: 3.9 MMOL/L (ref 3.6–5.5)
PROT SERPL-MCNC: 6.6 G/DL (ref 6–8.2)
PROT UR QL STRIP: NEGATIVE MG/DL
RBC # BLD AUTO: 4.41 M/UL (ref 4.2–5.4)
RBC UR QL AUTO: NEGATIVE
SODIUM SERPL-SCNC: 140 MMOL/L (ref 135–145)
SP GR UR STRIP.AUTO: 1.02 (ref 1–1.03)
T4 FREE SERPL-MCNC: 0.93 NG/DL (ref 0.93–1.7)
TSH SERPL DL<=0.005 MIU/L-ACNC: 1.75 UIU/ML (ref 0.38–5.33)
WBC # BLD AUTO: 8.5 K/UL (ref 4.8–10.8)

## 2023-09-29 PROCEDURE — 99282 EMERGENCY DEPT VISIT SF MDM: CPT

## 2023-09-29 PROCEDURE — 81002 URINALYSIS NONAUTO W/O SCOPE: CPT

## 2023-09-29 PROCEDURE — 84443 ASSAY THYROID STIM HORMONE: CPT

## 2023-09-29 PROCEDURE — 36415 COLL VENOUS BLD VENIPUNCTURE: CPT

## 2023-09-29 PROCEDURE — 80053 COMPREHEN METABOLIC PANEL: CPT

## 2023-09-29 PROCEDURE — 82728 ASSAY OF FERRITIN: CPT

## 2023-09-29 PROCEDURE — 84439 ASSAY OF FREE THYROXINE: CPT

## 2023-09-29 PROCEDURE — 85025 COMPLETE CBC W/AUTO DIFF WBC: CPT

## 2023-09-29 RX ORDER — FERROUS SULFATE 325(65) MG
325 TABLET ORAL DAILY
Qty: 60 TABLET | Refills: 1 | Status: ON HOLD | OUTPATIENT
Start: 2023-09-29 | End: 2024-01-20

## 2023-09-29 ASSESSMENT — PAIN SCALES - GENERAL: PAINLEVEL: 0 - NO PAIN

## 2023-09-29 ASSESSMENT — FIBROSIS 4 INDEX: FIB4 SCORE: 0.37

## 2023-09-29 NOTE — PROGRESS NOTES
PT is a , EDC , GA 24 0. Pt arrives with c/o feeling lightheaded intermittently x 3-4 weeks. Pt states she feels like she is spinning. Pt states her blood pressure does run on the lower side of normal usually. PT states yesterday when she was walking back from the bathroom she felt lightheaded and vision going black almost passed out but was able to slide herself down the wall for support. Pt with a history of asthma related to seasonal allergies and uses her albuterol as needed but not frequently, denies other medical history. EFM and toco connected.   1345 - MD Mckeon given report and orders for labs received. Pt ok to come of monitor per MD.   At approx 1520 MD Doran at bedside. Labs reviewed, order for ferritin lab placed.  1616 - Per MD Doran pt ok for discharge home to follow-up with PCP and pickup Iron prescription from pharmacy. Pt given discharge and follow-up instructions with return precautions. Advised MD Doran would follow-up on her pending lab. T discharged home in stable condition.

## 2023-09-29 NOTE — ED PROVIDER NOTES
"  Constantin Bobo   24w0d      Subjective:  Patient came in with complaining of feeling lightheaded for the last 3 to 4 weeks.  Occasionally she feels like everything spinning.  Patient states that yesterday when she was going to the bathroom she felt lightheaded and her vision felt like it was going black and almost passed out but did not fall.  Patient is feeling fetal movement, no contractions, no vaginal bleeding.  Patient denies any upper respiratory infection symptoms.  No fever no chills.    Objective:   Vitals:    09/29/23 1323 09/29/23 1342   BP: 96/57 108/63   Pulse: (!) 105 89   Resp: 18    Temp: 37 °C (98.6 °F)    TempSrc: Temporal    SpO2: 97% 95%   Weight: 85.7 kg (189 lb)    Height: 1.575 m (5' 2\")      Gen:NAD, pale  Abd: soft, NT, gravid  FHT: 140's AGA  [place]: none  Ext: no calf tenderness     Membranes ruptured: .no        Labs:  Recent Results (from the past 24 hour(s))   POCT urinalysis device results    Collection Time: 09/29/23  2:28 PM   Result Value Ref Range    POC Color Yellow     POC Appearance Clear     POC Glucose Negative Negative mg/dL    POC Ketones 40 (A) Negative mg/dL    POC Specific Gravity 1.025 1.005 - 1.030    POC Blood Negative Negative    POC Urine PH 7.0 5.0 - 8.0    POC Protein Negative Negative mg/dL    POC Nitrites Negative Negative    POC Leukocyte Esterase Trace (A) Negative   CBC WITH DIFFERENTIAL    Collection Time: 09/29/23  2:44 PM   Result Value Ref Range    WBC 8.5 4.8 - 10.8 K/uL    RBC 4.41 4.20 - 5.40 M/uL    Hemoglobin 11.3 (L) 12.0 - 16.0 g/dL    Hematocrit 34.9 (L) 37.0 - 47.0 %    MCV 79.1 (L) 81.4 - 97.8 fL    MCH 25.6 (L) 27.0 - 33.0 pg    MCHC 32.4 32.2 - 35.5 g/dL    RDW 38.5 35.9 - 50.0 fL    Platelet Count 243 164 - 446 K/uL    MPV 10.1 9.0 - 12.9 fL    Neutrophils-Polys 72.10 (H) 44.00 - 72.00 %    Lymphocytes 20.50 (L) 22.00 - 41.00 %    Monocytes 5.40 0.00 - 13.40 %    Eosinophils 0.80 0.00 - 6.90 %    Basophils 0.40 0.00 - 1.80 %    " Immature Granulocytes 0.80 0.00 - 0.90 %    Nucleated RBC 0.20 0.00 - 0.20 /100 WBC    Neutrophils (Absolute) 6.14 1.82 - 7.42 K/uL    Lymphs (Absolute) 1.75 1.00 - 4.80 K/uL    Monos (Absolute) 0.46 0.00 - 0.85 K/uL    Eos (Absolute) 0.07 0.00 - 0.51 K/uL    Baso (Absolute) 0.03 0.00 - 0.12 K/uL    Immature Granulocytes (abs) 0.07 0.00 - 0.11 K/uL    NRBC (Absolute) 0.02 K/uL       Assessment:   24w0d/Lighheaded-pt with anemia with a hg of 11.3. Will add Ferritin to her labs. I d/w pt increasing leafy vegetables and starting pt on ferrous sulfate 325 mg one tab bid. Symptoms are likely secondary to anemia. Pending TSH and CMP. Pt will f/u with PCP if sx not improved. Will d/c home if the rest of the labs are normal. Pt is clinically stable with normal vitals.

## 2023-12-03 ENCOUNTER — HOSPITAL ENCOUNTER (EMERGENCY)
Facility: MEDICAL CENTER | Age: 23
End: 2023-12-03
Attending: OBSTETRICS & GYNECOLOGY | Admitting: OBSTETRICS & GYNECOLOGY
Payer: COMMERCIAL

## 2023-12-03 VITALS
OXYGEN SATURATION: 95 % | HEART RATE: 108 BPM | BODY MASS INDEX: 36.8 KG/M2 | HEIGHT: 62 IN | RESPIRATION RATE: 18 BRPM | SYSTOLIC BLOOD PRESSURE: 116 MMHG | DIASTOLIC BLOOD PRESSURE: 68 MMHG | TEMPERATURE: 97.6 F | WEIGHT: 200 LBS

## 2023-12-03 LAB
APPEARANCE UR: CLEAR
COLOR UR AUTO: YELLOW
GLUCOSE UR QL STRIP.AUTO: NEGATIVE MG/DL
KETONES UR QL STRIP.AUTO: NEGATIVE MG/DL
LEUKOCYTE ESTERASE UR QL STRIP.AUTO: ABNORMAL
NITRITE UR QL STRIP.AUTO: NEGATIVE
PH UR STRIP.AUTO: 8.5 [PH] (ref 5–8)
PROT UR QL STRIP: 30 MG/DL
RBC UR QL AUTO: NEGATIVE
SP GR UR STRIP.AUTO: 1.01 (ref 1–1.03)

## 2023-12-03 PROCEDURE — 59025 FETAL NON-STRESS TEST: CPT

## 2023-12-03 PROCEDURE — 99284 EMERGENCY DEPT VISIT MOD MDM: CPT

## 2023-12-03 PROCEDURE — 81002 URINALYSIS NONAUTO W/O SCOPE: CPT

## 2023-12-03 ASSESSMENT — FIBROSIS 4 INDEX: FIB4 SCORE: 0.4

## 2023-12-03 ASSESSMENT — PAIN SCALES - GENERAL: PAINLEVEL: 6

## 2023-12-04 NOTE — ED PROVIDER NOTES
"Gualberto Bobo   33w2d      Subjective:  Pt was watching t.v. at about 5 pm and had a severe \"ring like\" pain from her upper abdomen radiating to her back. Pt got scared and came to L & D. Her abdominal pain has resolved and she complaints of upper back pain, 3/10 in severity. Pt without pain with urination and no f/c. Pt denies contractions and no vaginal bleeding. Pt declines anything for pain.   Uterine contractions:no  Pain: No    Objective:   Vitals:    23 1823   BP: 116/68   Pulse: (!) 108   Resp: 18   Temp: 36.4 °C (97.6 °F)   TempSrc: Temporal   SpO2: 95%   Weight: 90.7 kg (200 lb)   Height: 1.575 m (5' 2\")     Gen: NAD  Back: no CVA tenderness  Abd: soft, gravid, non-tender  FHT: 140's cat 1  [place]: none  SVE: cl/th/high, per nurse    Membranes ruptured: .no        Labs:  Recent Results (from the past 24 hour(s))   POCT urinalysis device results    Collection Time: 23  7:14 PM   Result Value Ref Range    POC Color Yellow     POC Appearance Clear     POC Glucose Negative Negative mg/dL    POC Ketones Negative Negative mg/dL    POC Specific Gravity 1.015 1.005 - 1.030    POC Blood Negative Negative    POC Urine PH 8.5 (A) 5.0 - 8.0    POC Protein 30 (A) Negative mg/dL    POC Nitrites Negative Negative    POC Leukocyte Esterase Small (A) Negative       Assessment:   33w2d/abdominal pain/back pain-pt's abdominal pain has resolved. Pt without UTI sx and no evidence of  labor. Pt feels better and wants to go home. Likely musculoskeletal pain. Pt can use tylenol and heating pad. Pt has an appointment with me in 2 weeks.  Fetal status-reassuring        "

## 2023-12-04 NOTE — PROGRESS NOTES
- Received report from RANCHO Garcia.     - Patient arrived to triage with complaints of abdominal pain and lower back pain that started at about 1700 today. Patient is a  with an WAYNE of 24 making her 33.2 weeks pregnant. Patient states the abdominal pain has subsided, but the lower back pain is still present. Patient denies s/s of UTI, such as painful urination or bleeding upon urination.  Patient denies LOF or VB and confirms good FM.     - Phone call to Dr. Doran. Report given to provider. Provider notified of urine dip results. Received orders to perform a SVE and offer tylenol to patient. Patient to be monitored for contractions.    - SVE performed; please refer to flowsheets. Patient denies tylenol at this time. Water given to patient.     - Dr. Doran notified of SVE.     - Dr. Doran at bedside talking to patient.     - Received discharge orders.     - Discharge instructions discussed, patient verbalized understanding. UC's, ROM, VB, abn FM, when to return to L&D discussed. Patient escorted off unit without any complications.

## 2023-12-04 NOTE — PROGRESS NOTES
22 y/o  EDC 24, EGA 33.2, here to L&D with c/o constant abdominal pain starting in upper belly and radiating to low back that started at 1700. EFM/TOCO applied, pt states positive FM. Denies vaginal LOF or bleeding. History of asthma last used inhaler 2 months ago. States no pregnancy complications

## 2023-12-29 ENCOUNTER — HOSPITAL ENCOUNTER (OUTPATIENT)
Facility: MEDICAL CENTER | Age: 23
End: 2023-12-29
Attending: OBSTETRICS & GYNECOLOGY
Payer: COMMERCIAL

## 2023-12-29 LAB — AMBIGUOUS DTTM AMBI4: NORMAL

## 2023-12-29 PROCEDURE — 87150 DNA/RNA AMPLIFIED PROBE: CPT

## 2023-12-29 PROCEDURE — 87081 CULTURE SCREEN ONLY: CPT

## 2023-12-30 ENCOUNTER — HOSPITAL ENCOUNTER (OUTPATIENT)
Dept: LAB | Facility: MEDICAL CENTER | Age: 23
End: 2023-12-30
Attending: OBSTETRICS & GYNECOLOGY
Payer: COMMERCIAL

## 2023-12-30 LAB
ANISOCYTOSIS BLD QL SMEAR: ABNORMAL
BASOPHILS # BLD AUTO: 0.3 % (ref 0–1.8)
BASOPHILS # BLD: 0.02 K/UL (ref 0–0.12)
COMMENT 1642: NORMAL
EOSINOPHIL # BLD AUTO: 0.07 K/UL (ref 0–0.51)
EOSINOPHIL NFR BLD: 1.1 % (ref 0–6.9)
ERYTHROCYTE [DISTWIDTH] IN BLOOD BY AUTOMATED COUNT: 41.7 FL (ref 35.9–50)
GLUCOSE 1H P 50 G GLC PO SERPL-MCNC: 167 MG/DL (ref 70–139)
GP B STREP DNA SPEC QL NAA+PROBE: NEGATIVE
HCT VFR BLD AUTO: 33.4 % (ref 37–47)
HGB BLD-MCNC: 9.9 G/DL (ref 12–16)
HYPOCHROMIA BLD QL SMEAR: ABNORMAL
IMM GRANULOCYTES # BLD AUTO: 0.09 K/UL (ref 0–0.11)
IMM GRANULOCYTES NFR BLD AUTO: 1.4 % (ref 0–0.9)
LYMPHOCYTES # BLD AUTO: 1.35 K/UL (ref 1–4.8)
LYMPHOCYTES NFR BLD: 20.5 % (ref 22–41)
MCH RBC QN AUTO: 20.8 PG (ref 27–33)
MCHC RBC AUTO-ENTMCNC: 29.6 G/DL (ref 32.2–35.5)
MCV RBC AUTO: 70 FL (ref 81.4–97.8)
MICROCYTES BLD QL SMEAR: ABNORMAL
MONOCYTES # BLD AUTO: 0.27 K/UL (ref 0–0.85)
MONOCYTES NFR BLD AUTO: 4.1 % (ref 0–13.4)
MORPHOLOGY BLD-IMP: NORMAL
NEUTROPHILS # BLD AUTO: 4.8 K/UL (ref 1.82–7.42)
NEUTROPHILS NFR BLD: 72.6 % (ref 44–72)
NRBC # BLD AUTO: 0 K/UL
NRBC BLD-RTO: 0 /100 WBC (ref 0–0.2)
PLATELET # BLD AUTO: 255 K/UL (ref 164–446)
PLATELET BLD QL SMEAR: NORMAL
PMV BLD AUTO: 11.1 FL (ref 9–12.9)
RBC # BLD AUTO: 4.77 M/UL (ref 4.2–5.4)
RBC BLD AUTO: PRESENT
T PALLIDUM AB SER QL IA: NORMAL
WBC # BLD AUTO: 6.6 K/UL (ref 4.8–10.8)

## 2023-12-30 PROCEDURE — 82950 GLUCOSE TEST: CPT

## 2023-12-30 PROCEDURE — 36415 COLL VENOUS BLD VENIPUNCTURE: CPT

## 2023-12-30 PROCEDURE — 85025 COMPLETE CBC W/AUTO DIFF WBC: CPT

## 2023-12-30 PROCEDURE — 86780 TREPONEMA PALLIDUM: CPT

## 2024-01-06 ENCOUNTER — HOSPITAL ENCOUNTER (OUTPATIENT)
Dept: LAB | Facility: MEDICAL CENTER | Age: 24
End: 2024-01-06
Attending: OBSTETRICS & GYNECOLOGY
Payer: COMMERCIAL

## 2024-01-06 LAB
GLUCOSE 1H P CHAL SERPL-MCNC: 163 MG/DL (ref 65–180)
GLUCOSE 2H P CHAL SERPL-MCNC: 182 MG/DL (ref 65–155)
GLUCOSE 3H P CHAL SERPL-MCNC: 171 MG/DL (ref 65–140)
GLUCOSE BS SERPL-MCNC: 77 MG/DL (ref 65–95)

## 2024-01-06 PROCEDURE — 36415 COLL VENOUS BLD VENIPUNCTURE: CPT

## 2024-01-06 PROCEDURE — 82952 GTT-ADDED SAMPLES: CPT

## 2024-01-06 PROCEDURE — 82951 GLUCOSE TOLERANCE TEST (GTT): CPT

## 2024-01-18 ENCOUNTER — ANESTHESIA EVENT (OUTPATIENT)
Dept: ANESTHESIOLOGY | Facility: MEDICAL CENTER | Age: 24
End: 2024-01-18
Payer: COMMERCIAL

## 2024-01-18 ENCOUNTER — APPOINTMENT (OUTPATIENT)
Dept: OBGYN | Facility: MEDICAL CENTER | Age: 24
End: 2024-01-18
Attending: OBSTETRICS & GYNECOLOGY
Payer: COMMERCIAL

## 2024-01-18 ENCOUNTER — HOSPITAL ENCOUNTER (INPATIENT)
Facility: MEDICAL CENTER | Age: 24
LOS: 2 days | End: 2024-01-20
Attending: OBSTETRICS & GYNECOLOGY | Admitting: OBSTETRICS & GYNECOLOGY
Payer: COMMERCIAL

## 2024-01-18 ENCOUNTER — ANESTHESIA (OUTPATIENT)
Dept: ANESTHESIOLOGY | Facility: MEDICAL CENTER | Age: 24
End: 2024-01-18
Payer: COMMERCIAL

## 2024-01-18 DIAGNOSIS — D62 ANEMIA ASSOCIATED WITH ACUTE BLOOD LOSS: ICD-10-CM

## 2024-01-18 DIAGNOSIS — R52 POSTPARTUM PAIN: ICD-10-CM

## 2024-01-18 LAB
ABO GROUP BLD: NORMAL
ALBUMIN SERPL BCP-MCNC: 3.6 G/DL (ref 3.2–4.9)
ALBUMIN/GLOB SERPL: 1.2 G/DL
ALP SERPL-CCNC: 207 U/L (ref 30–99)
ALT SERPL-CCNC: 10 U/L (ref 2–50)
ANION GAP SERPL CALC-SCNC: 13 MMOL/L (ref 7–16)
ANISOCYTOSIS BLD QL SMEAR: ABNORMAL
AST SERPL-CCNC: 19 U/L (ref 12–45)
BASOPHILS # BLD AUTO: 0 % (ref 0–1.8)
BASOPHILS # BLD: 0 K/UL (ref 0–0.12)
BILIRUB SERPL-MCNC: 0.4 MG/DL (ref 0.1–1.5)
BLD GP AB SCN SERPL QL: NORMAL
BUN SERPL-MCNC: 7 MG/DL (ref 8–22)
CALCIUM ALBUM COR SERPL-MCNC: 8.9 MG/DL (ref 8.5–10.5)
CALCIUM SERPL-MCNC: 8.6 MG/DL (ref 8.5–10.5)
CHLORIDE SERPL-SCNC: 109 MMOL/L (ref 96–112)
CO2 SERPL-SCNC: 15 MMOL/L (ref 20–33)
CREAT SERPL-MCNC: 0.45 MG/DL (ref 0.5–1.4)
EOSINOPHIL # BLD AUTO: 0 K/UL (ref 0–0.51)
EOSINOPHIL NFR BLD: 0 % (ref 0–6.9)
ERYTHROCYTE [DISTWIDTH] IN BLOOD BY AUTOMATED COUNT: 41.5 FL (ref 35.9–50)
GFR SERPLBLD CREATININE-BSD FMLA CKD-EPI: 140 ML/MIN/1.73 M 2
GLOBULIN SER CALC-MCNC: 3 G/DL (ref 1.9–3.5)
GLUCOSE BLD STRIP.AUTO-MCNC: 71 MG/DL (ref 65–99)
GLUCOSE BLD STRIP.AUTO-MCNC: 71 MG/DL (ref 65–99)
GLUCOSE SERPL-MCNC: 78 MG/DL (ref 65–99)
HCT VFR BLD AUTO: 33.6 % (ref 37–47)
HGB BLD-MCNC: 9.9 G/DL (ref 12–16)
HOLDING TUBE BB 8507: NORMAL
LYMPHOCYTES # BLD AUTO: 1.75 K/UL (ref 1–4.8)
LYMPHOCYTES NFR BLD: 23.7 % (ref 22–41)
MANUAL DIFF BLD: NORMAL
MCH RBC QN AUTO: 20 PG (ref 27–33)
MCHC RBC AUTO-ENTMCNC: 29.5 G/DL (ref 32.2–35.5)
MCV RBC AUTO: 67.7 FL (ref 81.4–97.8)
MICROCYTES BLD QL SMEAR: ABNORMAL
MONOCYTES # BLD AUTO: 0.26 K/UL (ref 0–0.85)
MONOCYTES NFR BLD AUTO: 3.5 % (ref 0–13.4)
MORPHOLOGY BLD-IMP: NORMAL
NEUTROPHILS # BLD AUTO: 5.39 K/UL (ref 1.82–7.42)
NEUTROPHILS NFR BLD: 72.8 % (ref 44–72)
NRBC # BLD AUTO: 0 K/UL
NRBC BLD-RTO: 0 /100 WBC (ref 0–0.2)
PLATELET # BLD AUTO: 251 K/UL (ref 164–446)
PLATELET BLD QL SMEAR: NORMAL
PMV BLD AUTO: 11.3 FL (ref 9–12.9)
POTASSIUM SERPL-SCNC: 4.1 MMOL/L (ref 3.6–5.5)
PROT SERPL-MCNC: 6.6 G/DL (ref 6–8.2)
RBC # BLD AUTO: 4.96 M/UL (ref 4.2–5.4)
RBC BLD AUTO: PRESENT
RH BLD: NORMAL
SODIUM SERPL-SCNC: 137 MMOL/L (ref 135–145)
T PALLIDUM AB SER QL IA: NORMAL
WBC # BLD AUTO: 7.4 K/UL (ref 4.8–10.8)

## 2024-01-18 PROCEDURE — A9270 NON-COVERED ITEM OR SERVICE: HCPCS | Performed by: OBSTETRICS & GYNECOLOGY

## 2024-01-18 PROCEDURE — 700102 HCHG RX REV CODE 250 W/ 637 OVERRIDE(OP): Performed by: OBSTETRICS & GYNECOLOGY

## 2024-01-18 PROCEDURE — 86780 TREPONEMA PALLIDUM: CPT

## 2024-01-18 PROCEDURE — 85027 COMPLETE CBC AUTOMATED: CPT

## 2024-01-18 PROCEDURE — 80053 COMPREHEN METABOLIC PANEL: CPT

## 2024-01-18 PROCEDURE — 700111 HCHG RX REV CODE 636 W/ 250 OVERRIDE (IP): Performed by: ANESTHESIOLOGY

## 2024-01-18 PROCEDURE — 3E033VJ INTRODUCTION OF OTHER HORMONE INTO PERIPHERAL VEIN, PERCUTANEOUS APPROACH: ICD-10-PCS | Performed by: OBSTETRICS & GYNECOLOGY

## 2024-01-18 PROCEDURE — 82962 GLUCOSE BLOOD TEST: CPT

## 2024-01-18 PROCEDURE — 700111 HCHG RX REV CODE 636 W/ 250 OVERRIDE (IP): Performed by: OBSTETRICS & GYNECOLOGY

## 2024-01-18 PROCEDURE — 304965 HCHG RECOVERY SERVICES

## 2024-01-18 PROCEDURE — 59409 OBSTETRICAL CARE: CPT

## 2024-01-18 PROCEDURE — 10907ZC DRAINAGE OF AMNIOTIC FLUID, THERAPEUTIC FROM PRODUCTS OF CONCEPTION, VIA NATURAL OR ARTIFICIAL OPENING: ICD-10-PCS | Performed by: OBSTETRICS & GYNECOLOGY

## 2024-01-18 PROCEDURE — 303615 HCHG EPIDURAL/SPINAL ANESTHESIA FOR LABOR

## 2024-01-18 PROCEDURE — 85007 BL SMEAR W/DIFF WBC COUNT: CPT

## 2024-01-18 PROCEDURE — 36415 COLL VENOUS BLD VENIPUNCTURE: CPT

## 2024-01-18 PROCEDURE — 770002 HCHG ROOM/CARE - OB PRIVATE (112)

## 2024-01-18 PROCEDURE — 700111 HCHG RX REV CODE 636 W/ 250 OVERRIDE (IP): Mod: JZ

## 2024-01-18 PROCEDURE — 3E0P7VZ INTRODUCTION OF HORMONE INTO FEMALE REPRODUCTIVE, VIA NATURAL OR ARTIFICIAL OPENING: ICD-10-PCS | Performed by: OBSTETRICS & GYNECOLOGY

## 2024-01-18 PROCEDURE — A9270 NON-COVERED ITEM OR SERVICE: HCPCS

## 2024-01-18 PROCEDURE — 700105 HCHG RX REV CODE 258: Performed by: OBSTETRICS & GYNECOLOGY

## 2024-01-18 PROCEDURE — 700102 HCHG RX REV CODE 250 W/ 637 OVERRIDE(OP)

## 2024-01-18 RX ORDER — MISOPROSTOL 200 UG/1
600 TABLET ORAL
Status: DISCONTINUED | OUTPATIENT
Start: 2024-01-18 | End: 2024-01-20 | Stop reason: HOSPADM

## 2024-01-18 RX ORDER — BUPIVACAINE HYDROCHLORIDE 2.5 MG/ML
INJECTION, SOLUTION EPIDURAL; INFILTRATION; INTRACAUDAL PRN
Status: DISCONTINUED | OUTPATIENT
Start: 2024-01-18 | End: 2024-01-18 | Stop reason: HOSPADM

## 2024-01-18 RX ORDER — ROPIVACAINE HYDROCHLORIDE 2 MG/ML
INJECTION, SOLUTION EPIDURAL; INFILTRATION; PERINEURAL CONTINUOUS
Status: DISCONTINUED | OUTPATIENT
Start: 2024-01-18 | End: 2024-01-18

## 2024-01-18 RX ORDER — SODIUM CHLORIDE, SODIUM LACTATE, POTASSIUM CHLORIDE, AND CALCIUM CHLORIDE .6; .31; .03; .02 G/100ML; G/100ML; G/100ML; G/100ML
500 INJECTION, SOLUTION INTRAVENOUS ONCE
Status: COMPLETED | OUTPATIENT
Start: 2024-01-18 | End: 2024-01-19

## 2024-01-18 RX ORDER — DOCUSATE SODIUM 100 MG/1
100 CAPSULE, LIQUID FILLED ORAL 2 TIMES DAILY PRN
Status: DISCONTINUED | OUTPATIENT
Start: 2024-01-18 | End: 2024-01-20 | Stop reason: HOSPADM

## 2024-01-18 RX ORDER — METHYLERGONOVINE MALEATE 0.2 MG/ML
0.2 INJECTION INTRAVENOUS
Status: DISCONTINUED | OUTPATIENT
Start: 2024-01-18 | End: 2024-01-20 | Stop reason: HOSPADM

## 2024-01-18 RX ORDER — BUPIVACAINE HYDROCHLORIDE 2.5 MG/ML
INJECTION, SOLUTION EPIDURAL; INFILTRATION; INTRACAUDAL
Status: COMPLETED
Start: 2024-01-18 | End: 2024-01-18

## 2024-01-18 RX ORDER — CARBOPROST TROMETHAMINE 250 UG/ML
250 INJECTION, SOLUTION INTRAMUSCULAR
Status: DISCONTINUED | OUTPATIENT
Start: 2024-01-18 | End: 2024-01-20 | Stop reason: HOSPADM

## 2024-01-18 RX ORDER — OXYTOCIN 10 [USP'U]/ML
10 INJECTION, SOLUTION INTRAMUSCULAR; INTRAVENOUS
Status: DISCONTINUED | OUTPATIENT
Start: 2024-01-18 | End: 2024-01-18 | Stop reason: HOSPADM

## 2024-01-18 RX ORDER — SODIUM CHLORIDE, SODIUM LACTATE, POTASSIUM CHLORIDE, CALCIUM CHLORIDE 600; 310; 30; 20 MG/100ML; MG/100ML; MG/100ML; MG/100ML
INJECTION, SOLUTION INTRAVENOUS CONTINUOUS
Status: DISCONTINUED | OUTPATIENT
Start: 2024-01-18 | End: 2024-01-18

## 2024-01-18 RX ORDER — TERBUTALINE SULFATE 1 MG/ML
0.25 INJECTION, SOLUTION SUBCUTANEOUS
Status: DISCONTINUED | OUTPATIENT
Start: 2024-01-18 | End: 2024-01-18 | Stop reason: HOSPADM

## 2024-01-18 RX ORDER — ACETAMINOPHEN 500 MG
1000 TABLET ORAL EVERY 6 HOURS PRN
Status: DISCONTINUED | OUTPATIENT
Start: 2024-01-18 | End: 2024-01-20 | Stop reason: HOSPADM

## 2024-01-18 RX ORDER — ROPIVACAINE HYDROCHLORIDE 2 MG/ML
INJECTION, SOLUTION EPIDURAL; INFILTRATION; PERINEURAL
Status: COMPLETED
Start: 2024-01-18 | End: 2024-01-18

## 2024-01-18 RX ORDER — ONDANSETRON 4 MG/1
4 TABLET, ORALLY DISINTEGRATING ORAL EVERY 6 HOURS PRN
Status: DISCONTINUED | OUTPATIENT
Start: 2024-01-18 | End: 2024-01-18 | Stop reason: HOSPADM

## 2024-01-18 RX ORDER — ACETAMINOPHEN 500 MG
1000 TABLET ORAL
Status: DISCONTINUED | OUTPATIENT
Start: 2024-01-18 | End: 2024-01-18 | Stop reason: HOSPADM

## 2024-01-18 RX ORDER — MISOPROSTOL 200 UG/1
TABLET ORAL
Status: COMPLETED
Start: 2024-01-18 | End: 2024-01-18

## 2024-01-18 RX ORDER — MISOPROSTOL 200 UG/1
800 TABLET ORAL ONCE
Status: COMPLETED | OUTPATIENT
Start: 2024-01-18 | End: 2024-01-18

## 2024-01-18 RX ORDER — IBUPROFEN 800 MG/1
800 TABLET ORAL EVERY 8 HOURS PRN
Status: DISCONTINUED | OUTPATIENT
Start: 2024-01-18 | End: 2024-01-20 | Stop reason: HOSPADM

## 2024-01-18 RX ORDER — IBUPROFEN 800 MG/1
800 TABLET ORAL
Status: COMPLETED | OUTPATIENT
Start: 2024-01-18 | End: 2024-01-18

## 2024-01-18 RX ORDER — SODIUM CHLORIDE, SODIUM LACTATE, POTASSIUM CHLORIDE, AND CALCIUM CHLORIDE .6; .31; .03; .02 G/100ML; G/100ML; G/100ML; G/100ML
250 INJECTION, SOLUTION INTRAVENOUS PRN
Status: DISCONTINUED | OUTPATIENT
Start: 2024-01-18 | End: 2024-01-18 | Stop reason: HOSPADM

## 2024-01-18 RX ORDER — SODIUM CHLORIDE, SODIUM LACTATE, POTASSIUM CHLORIDE, CALCIUM CHLORIDE 600; 310; 30; 20 MG/100ML; MG/100ML; MG/100ML; MG/100ML
INJECTION, SOLUTION INTRAVENOUS PRN
Status: DISCONTINUED | OUTPATIENT
Start: 2024-01-18 | End: 2024-01-20 | Stop reason: HOSPADM

## 2024-01-18 RX ORDER — LIDOCAINE HYDROCHLORIDE 10 MG/ML
20 INJECTION, SOLUTION INFILTRATION; PERINEURAL
Status: DISCONTINUED | OUTPATIENT
Start: 2024-01-18 | End: 2024-01-18 | Stop reason: HOSPADM

## 2024-01-18 RX ORDER — EPHEDRINE SULFATE 50 MG/ML
5 INJECTION, SOLUTION INTRAVENOUS
Status: DISCONTINUED | OUTPATIENT
Start: 2024-01-18 | End: 2024-01-18 | Stop reason: HOSPADM

## 2024-01-18 RX ORDER — ACETAMINOPHEN 500 MG
1000 TABLET ORAL ONCE
Status: DISPENSED | OUTPATIENT
Start: 2024-01-18 | End: 2024-01-19

## 2024-01-18 RX ORDER — SODIUM CHLORIDE, SODIUM LACTATE, POTASSIUM CHLORIDE, AND CALCIUM CHLORIDE .6; .31; .03; .02 G/100ML; G/100ML; G/100ML; G/100ML
1000 INJECTION, SOLUTION INTRAVENOUS
Status: DISCONTINUED | OUTPATIENT
Start: 2024-01-18 | End: 2024-01-18 | Stop reason: HOSPADM

## 2024-01-18 RX ORDER — ONDANSETRON 2 MG/ML
4 INJECTION INTRAMUSCULAR; INTRAVENOUS EVERY 6 HOURS PRN
Status: DISCONTINUED | OUTPATIENT
Start: 2024-01-18 | End: 2024-01-18 | Stop reason: HOSPADM

## 2024-01-18 RX ADMIN — FENTANYL CITRATE 50 MCG: 50 INJECTION, SOLUTION INTRAMUSCULAR; INTRAVENOUS at 11:17

## 2024-01-18 RX ADMIN — BUPIVACAINE HYDROCHLORIDE 3 ML: 2.5 INJECTION, SOLUTION EPIDURAL; INFILTRATION; INTRACAUDAL at 11:22

## 2024-01-18 RX ADMIN — SODIUM CHLORIDE, POTASSIUM CHLORIDE, SODIUM LACTATE AND CALCIUM CHLORIDE: 600; 310; 30; 20 INJECTION, SOLUTION INTRAVENOUS at 10:38

## 2024-01-18 RX ADMIN — ROPIVACAINE HYDROCHLORIDE: 2 INJECTION, SOLUTION EPIDURAL; INFILTRATION at 19:30

## 2024-01-18 RX ADMIN — BUPIVACAINE HYDROCHLORIDE 3 ML: 2.5 INJECTION, SOLUTION EPIDURAL; INFILTRATION; INTRACAUDAL at 11:17

## 2024-01-18 RX ADMIN — MISOPROSTOL 25 MCG: 100 TABLET ORAL at 06:35

## 2024-01-18 RX ADMIN — OXYTOCIN 2 MILLI-UNITS/MIN: 10 INJECTION, SOLUTION INTRAMUSCULAR; INTRAVENOUS at 10:38

## 2024-01-18 RX ADMIN — MISOPROSTOL 800 MCG: 200 TABLET ORAL at 20:15

## 2024-01-18 RX ADMIN — FENTANYL CITRATE 50 MCG: 50 INJECTION, SOLUTION INTRAMUSCULAR; INTRAVENOUS at 11:22

## 2024-01-18 RX ADMIN — ROPIVACAINE HYDROCHLORIDE: 2 INJECTION, SOLUTION EPIDURAL; INFILTRATION; PERINEURAL at 19:30

## 2024-01-18 RX ADMIN — ONDANSETRON 4 MG: 2 INJECTION INTRAMUSCULAR; INTRAVENOUS at 12:56

## 2024-01-18 RX ADMIN — IBUPROFEN 800 MG: 800 TABLET, FILM COATED ORAL at 20:24

## 2024-01-18 RX ADMIN — SODIUM CHLORIDE, POTASSIUM CHLORIDE, SODIUM LACTATE AND CALCIUM CHLORIDE 500 ML: 600; 310; 30; 20 INJECTION, SOLUTION INTRAVENOUS at 23:36

## 2024-01-18 RX ADMIN — OXYTOCIN 125 ML/HR: 10 INJECTION, SOLUTION INTRAMUSCULAR; INTRAVENOUS at 22:47

## 2024-01-18 RX ADMIN — ROPIVACAINE HYDROCHLORIDE 200 MG: 2 INJECTION, SOLUTION EPIDURAL; INFILTRATION; PERINEURAL at 11:21

## 2024-01-18 RX ADMIN — SODIUM CHLORIDE, POTASSIUM CHLORIDE, SODIUM LACTATE AND CALCIUM CHLORIDE: 600; 310; 30; 20 INJECTION, SOLUTION INTRAVENOUS at 11:31

## 2024-01-18 RX ADMIN — OXYTOCIN 125 ML/HR: 10 INJECTION, SOLUTION INTRAMUSCULAR; INTRAVENOUS at 21:31

## 2024-01-18 RX ADMIN — ACETAMINOPHEN 1000 MG: 500 TABLET ORAL at 23:29

## 2024-01-18 RX ADMIN — OXYTOCIN 20 UNITS: 10 INJECTION, SOLUTION INTRAMUSCULAR; INTRAVENOUS at 19:57

## 2024-01-18 RX ADMIN — ROPIVACAINE HYDROCHLORIDE 200 MG: 2 INJECTION, SOLUTION EPIDURAL; INFILTRATION at 11:21

## 2024-01-18 ASSESSMENT — PATIENT HEALTH QUESTIONNAIRE - PHQ9
1. LITTLE INTEREST OR PLEASURE IN DOING THINGS: NOT AT ALL
SUM OF ALL RESPONSES TO PHQ9 QUESTIONS 1 AND 2: 0
2. FEELING DOWN, DEPRESSED, IRRITABLE, OR HOPELESS: NOT AT ALL

## 2024-01-18 ASSESSMENT — FIBROSIS 4 INDEX: FIB4 SCORE: 0.38

## 2024-01-18 ASSESSMENT — LIFESTYLE VARIABLES
ALCOHOL_USE: NO
EVER_SMOKED: NEVER

## 2024-01-18 ASSESSMENT — PAIN DESCRIPTION - PAIN TYPE: TYPE: ACUTE PAIN

## 2024-01-18 ASSESSMENT — PAIN SCALES - GENERAL
PAINLEVEL: 0 - NO PAIN
PAIN_LEVEL: 0

## 2024-01-18 NOTE — PROGRESS NOTES
Late Entry    0522-TOCO and US on.  VSS.  Pt presents to L&D for her scheduled IOL at 39.6 weeks.  SVE=2/thick/-2.  0600-Dr. Doran called about pt-currently radha too often to place misoprostol (pt does not feel UCs)-she states she will ask Dr. Garcia to place.  0635-Misoprostol placed per Dr. Garcia.  1155-SVE=3/60/-2.  1241-AROM, clear per Dr. Doran, 3/50/-2.  1503-6-7/80/-2.  1636-SVE=ant lip/-2.  Dr. Doran called and updated.  1701-Dr. Doran called and requested SVE.  SVE=complete/-1.  1725-BS report given to Marylou VICKERS-pt care assumed

## 2024-01-18 NOTE — ANESTHESIA PROCEDURE NOTES
Epidural Block    Date/Time: 1/18/2024 11:12 AM    Performed by: Cole Hurt M.D.  Authorized by: Cole Hurt M.D.    Patient Location:  OB  Start Time:  1/18/2024 11:12 AM  End Time:  1/18/2024 11:17 AM  Reason for Block: labor analgesia    patient identified, IV checked, site marked, risks and benefits discussed, surgical consent, monitors and equipment checked and pre-op evaluation    Patient Position:  Sitting  Prep: ChloraPrep, patient draped and sterile technique    Monitoring:  Blood pressure, continuous pulse oximetry and heart rate  Approach:  Midline  Location:  L3-L4  Injection Technique:  LUCIAN saline  Skin infiltration:  Lidocaine  Strength:  1%  Dose:  3ml  Needle Type:  Tuohy  Needle Gauge:  17 G  Needle Length:  3.5 in  Loss of resistance::  5  Catheter Size:  19 G  Catheter at Skin Depth:  12  Test Dose Result:  Negative   Success on 1st pass  No heme/CSF  Catheter threaded easily  Negative aspiration  No evidence of complications  Patient comfortable after loading dose

## 2024-01-18 NOTE — PROGRESS NOTES
"Labor Progress Note    Constantin Diana Bobo   1.  Intrauterine pregnancy at 39 weeks and 1 day.  2.  A1 diabetes.  3.  GBS negative.  4.  History of fetal pyelectasis, resolved.  5.  Iron deficiency anemia.      Subjective:  Pt comfortable with epidural.  Uterine contractions:yes  Pain: No    Objective:   Vitals:    01/18/24 0526 01/18/24 0600 01/18/24 0608 01/18/24 0952   BP: (!) 146/52  125/73    Pulse: (!) 116  (!) 103    Resp: 16      Temp: 37.1 °C (98.7 °F)   36.7 °C (98 °F)   TempSrc: Temporal   Temporal   Weight:  93.4 kg (206 lb)     Height:  1.575 m (5' 2\")       FHT: 140's cat 1  [place]: q 2 min  SVE:3/50/-2, attempted arom with FSE, no fluid  Membranes ruptured: .yes    Meds:   Epidural : .yes  Pitocin: .yes, 4 mu  S/p cytotec 25 mcg (once)    Labs:  Recent Results (from the past 24 hour(s))   Hold Blood Bank Specimen (Not Tested)    Collection Time: 01/18/24  5:45 AM   Result Value Ref Range    Holding Tube - Bb DONE    CBC with differential    Collection Time: 01/18/24  5:45 AM   Result Value Ref Range    WBC 7.4 4.8 - 10.8 K/uL    RBC 4.96 4.20 - 5.40 M/uL    Hemoglobin 9.9 (L) 12.0 - 16.0 g/dL    Hematocrit 33.6 (L) 37.0 - 47.0 %    MCV 67.7 (L) 81.4 - 97.8 fL    MCH 20.0 (L) 27.0 - 33.0 pg    MCHC 29.5 (L) 32.2 - 35.5 g/dL    RDW 41.5 35.9 - 50.0 fL    Platelet Count 251 164 - 446 K/uL    MPV 11.3 9.0 - 12.9 fL    Neutrophils-Polys 72.80 (H) 44.00 - 72.00 %    Lymphocytes 23.70 22.00 - 41.00 %    Monocytes 3.50 0.00 - 13.40 %    Eosinophils 0.00 0.00 - 6.90 %    Basophils 0.00 0.00 - 1.80 %    Nucleated RBC 0.00 0.00 - 0.20 /100 WBC    Neutrophils (Absolute) 5.39 1.82 - 7.42 K/uL    Lymphs (Absolute) 1.75 1.00 - 4.80 K/uL    Monos (Absolute) 0.26 0.00 - 0.85 K/uL    Eos (Absolute) 0.00 0.00 - 0.51 K/uL    Baso (Absolute) 0.00 0.00 - 0.12 K/uL    NRBC (Absolute) 0.00 K/uL    Anisocytosis 1+     Microcytosis 2+ (A)    T.PALLIDUM AB RENA (Syphilis)    Collection Time: 01/18/24  5:45 AM   Result Value " Ref Range    Syphilis, Treponemal Qual Non-Reactive Non-Reactive   Comp Metabolic Panel    Collection Time: 24  5:45 AM   Result Value Ref Range    Sodium 137 135 - 145 mmol/L    Potassium 4.1 3.6 - 5.5 mmol/L    Chloride 109 96 - 112 mmol/L    Co2 15 (L) 20 - 33 mmol/L    Anion Gap 13.0 7.0 - 16.0    Glucose 78 65 - 99 mg/dL    Bun 7 (L) 8 - 22 mg/dL    Creatinine 0.45 (L) 0.50 - 1.40 mg/dL    Calcium 8.6 8.5 - 10.5 mg/dL    Correct Calcium 8.9 8.5 - 10.5 mg/dL    AST(SGOT) 19 12 - 45 U/L    ALT(SGPT) 10 2 - 50 U/L    Alkaline Phosphatase 207 (H) 30 - 99 U/L    Total Bilirubin 0.4 0.1 - 1.5 mg/dL    Albumin 3.6 3.2 - 4.9 g/dL    Total Protein 6.6 6.0 - 8.2 g/dL    Globulin 3.0 1.9 - 3.5 g/dL    A-G Ratio 1.2 g/dL   DIFFERENTIAL MANUAL    Collection Time: 24  5:45 AM   Result Value Ref Range    Manual Diff Status PERFORMED    PERIPHERAL SMEAR REVIEW    Collection Time: 24  5:45 AM   Result Value Ref Range    Peripheral Smear Review see below    PLATELET ESTIMATE    Collection Time: 24  5:45 AM   Result Value Ref Range    Plt Estimation Normal    MORPHOLOGY    Collection Time: 24  5:45 AM   Result Value Ref Range    RBC Morphology Present    ESTIMATED GFR    Collection Time: 24  5:45 AM   Result Value Ref Range    GFR (CKD-EPI) 140 >60 mL/min/1.73 m 2   Type & Screen - Ip Obstetric Panel-BB (Aborh/Antibody Screen)    Collection Time: 24  5:45 AM   Result Value Ref Range    ABO Grouping Only AB     Rh Grouping Only POS     Antibody Screen Scrn NEG    POCT glucose device results    Collection Time: 24  9:56 AM   Result Value Ref Range    POC Glucose, Blood 71 65 - 99 mg/dL       Assessment:   39w1d/active labor-pt progressing. CPM. Exp .  GBBS negative  Iron deficiency anemia  Fetal status-reassuring        Terri Doran M.D.

## 2024-01-18 NOTE — CARE PLAN
Problem: Risk for Infection and Impaired Wound Healing  Goal: Patient will remain free from infection  Outcome: Progressing  Note: Hand hygiene before and after pt care     Problem: Pain  Goal: Patient's pain will be alleviated or reduced to the patient’s comfort goal  Outcome: Progressing  Note: Pt would like an epidural at some point and will verbalize when ready   The patient is Stable - Low risk of patient condition declining or worsening

## 2024-01-18 NOTE — H&P
DATE OF ADMISSION:  2024     ADMITTING DIAGNOSES:  1.  Intrauterine pregnancy at 39 weeks and 1 day.  2.  A1 diabetes.  3.  GBS negative.  4.  History of fetal pyelectasis, resolved.  5.  Iron deficiency anemia.     HISTORY OF PRESENT ILLNESS:  This patient is a 23-year-old  2, para 1   at 39 weeks and 1 day with a due date of 2024.  The patient has had   prenatal care with me.  She was referred to [facility] [place] for   suspected fetal pyelectasis.  She was evaluated by [facility] [place]   and she had a normal pelvic ultrasound without any evidence of fetal   pyelectasis.  The patient had sporadic prenatal care and did not have her   1-hour glucose tolerance test performed until late in the third trimester.    Her 1-hour glucose was elevated and her 3-hour performed on 2024 was   also abnormal.  The patient was referred back to [facility]   for diabetic teaching, but the patient was not able to be seen before her due   date.  At this time, the patient has been counseled about diabetic diet by me.    Written information has been given to her.  The patient was instructed to   check her fingerstick blood sugars fasting and 1 hour after meals.  The   patient is scheduled for induction of labor at 39 weeks and 1 day.  I have   discussed with the patient the risks, benefits, indications and alternatives   to surgery.  She has no unanswered questions and wants to proceed.     PAST MEDICAL HISTORY:  1.  A1 diabetes.  2.  Iron deficiency anemia.  3.  Obesity.     PAST SURGICAL HISTORY:  None.     MEDICATIONS:  She is on:  1.  Prenatal vitamins.  2.  Ferrous sulfate 325 mg 1 p.o. b.i.d.  3.  Pantoprazole 20 mg 1 p.o. every day.     ALLERGIES:  No known drug allergies.     OBSTETRICAL HISTORY:  She is a  2, para 1.  On 2020, at 40 weeks,   she had a normal spontaneous vaginal delivery, male, 7 pounds 2 ounces.     GYNECOLOGIC HISTORY:  The patient  started menstruating at age 14, has   menstrual cycles every 28 days and lasts 5 days.  Her last menstrual cycle was   on 2023.  Her last Pap smear was on 2023.  Negative Pap, negative   chlamydia, negative gonorrhea.     SOCIAL HISTORY:  The patient denies tobacco, alcohol or drug use.     PHYSICAL EXAMINATION:  VITAL SIGNS:  Blood pressure 115/74, afebrile.  GENERAL:  Pleasant female, in no acute distress.  LUNGS:  Clear to auscultation bilaterally.  CARDIOVASCULAR:  Regular rate and rhythm.  No murmur.  ABDOMEN:  Soft, gravid.  Leopold's to 7 pounds.  Fetal heart tones 130s,   category 1.  [place], none.  EXTREMITIES:  No calf tenderness.  PELVIC:  Sterile vaginal exam closed, thick and high.     PRENATAL LABORATORY DATA:  Three-hour glucose tolerance test performed on   2024, fasting glucose 77, 1-hour , 2-hour , 3-hour GTT   171.  One-hour glucose tolerance test elevated at 167.  H and H 9.9 and 33.4,   platelets were 255,000.  Group B Strep negative.  NIPT, male, negative for   chromosomes 13, 18 and 21.  Rubella nonimmune, hepatitis B surface antigen   negative, RPR nonreactive, A positive, antibody screen negative, hepatitis C   nonreactive, HIV nonreactive.     DIAGNOSTIC DATA:  Limited ultrasound, vertex presentation, RJ 12.5.  Anterior   placenta.  Ultrasound by [facility] on 2023, RJ 19.5.    Fetus measuring 34 weeks, vertex presentation, fetal growth appeared normal.    Anterior placenta, no placenta previa, normal fetal growth.     ASSESSMENT AND PLAN:  A 23-year-old  2, para 1 at 39 weeks and 1 day.  1.  A1 diabetes.  The patient had late one-hour glucose tolerance test that   was abnormal, followed by an abnormal 3-hour glucose tolerance test.  The   patient was given a prescription for a glucometer to test fingerstick blood   sugars fasting and 1-hour postprandial.  The patient has not been able to   check her blood sugars and she did not   the glucometer.  She was also   referred for diabetic teaching with [facility], but the   patient states that the appointment was going to be made after her delivery.    The patient has been given information on gestational diabetes.  We are   proceeding with induction of labor at 39 weeks and 1 day.  The patient is   aware of the risks, benefits, indications and alternatives to surgery.  We   will check her fingerstick blood sugars every 4 hours in latent labor and   every hour in active labor. At this time, the patient wants to proceed.  2.  GBS negative.  3.  Iron deficiency anemia.  The patient has been on ferrous sulfate 325 mg 1   p.o. b.i.d.  4.  Obesity.  5.  Fetal status reassuring.        ______________________________  RORY GILL MD    [facility]    DD:  01/17/2024 19:48  DT:  01/17/2024 20:48    Job#:  [id number]

## 2024-01-18 NOTE — PROGRESS NOTES
"Labor Progress Note    Constantin Bobo   39w6d/A1DM      Subjective:  Pt starting to feel mild contractions.  Uterine contractions:yes  Pain: Yes. Severity: mild    Objective:   Vitals:    01/18/24 0526 01/18/24 0600 01/18/24 0608   BP: (!) 146/52  125/73   Pulse: (!) 116  (!) 103   Resp: 16     Temp: 37.1 °C (98.7 °F)     TempSrc: Temporal     Weight:  93.4 kg (206 lb)    Height:  1.575 m (5' 2\")      FHT: 140's cat 1  [place]: q 2-3  SVE: defer  Membranes ruptured: .no    Meds:   Epidural : .no  Pitocin: .no  S/p cytotec at 6:30 am    Labs:  Recent Results (from the past 24 hour(s))   Hold Blood Bank Specimen (Not Tested)    Collection Time: 01/18/24  5:45 AM   Result Value Ref Range    Holding Tube - Bb DONE    CBC with differential    Collection Time: 01/18/24  5:45 AM   Result Value Ref Range    WBC 7.4 4.8 - 10.8 K/uL    RBC 4.96 4.20 - 5.40 M/uL    Hemoglobin 9.9 (L) 12.0 - 16.0 g/dL    Hematocrit 33.6 (L) 37.0 - 47.0 %    MCV 67.7 (L) 81.4 - 97.8 fL    MCH 20.0 (L) 27.0 - 33.0 pg    MCHC 29.5 (L) 32.2 - 35.5 g/dL    RDW 41.5 35.9 - 50.0 fL    Platelet Count 251 164 - 446 K/uL    MPV 11.3 9.0 - 12.9 fL    Neutrophils-Polys 72.80 (H) 44.00 - 72.00 %    Lymphocytes 23.70 22.00 - 41.00 %    Monocytes 3.50 0.00 - 13.40 %    Eosinophils 0.00 0.00 - 6.90 %    Basophils 0.00 0.00 - 1.80 %    Nucleated RBC 0.00 0.00 - 0.20 /100 WBC    Neutrophils (Absolute) 5.39 1.82 - 7.42 K/uL    Lymphs (Absolute) 1.75 1.00 - 4.80 K/uL    Monos (Absolute) 0.26 0.00 - 0.85 K/uL    Eos (Absolute) 0.00 0.00 - 0.51 K/uL    Baso (Absolute) 0.00 0.00 - 0.12 K/uL    NRBC (Absolute) 0.00 K/uL    Anisocytosis 1+     Microcytosis 2+ (A)    T.PALLIDUM AB RENA (Syphilis)    Collection Time: 01/18/24  5:45 AM   Result Value Ref Range    Syphilis, Treponemal Qual Non-Reactive Non-Reactive   Comp Metabolic Panel    Collection Time: 01/18/24  5:45 AM   Result Value Ref Range    Sodium 137 135 - 145 mmol/L    Potassium 4.1 3.6 - 5.5 mmol/L    " Chloride 109 96 - 112 mmol/L    Co2 15 (L) 20 - 33 mmol/L    Anion Gap 13.0 7.0 - 16.0    Glucose 78 65 - 99 mg/dL    Bun 7 (L) 8 - 22 mg/dL    Creatinine 0.45 (L) 0.50 - 1.40 mg/dL    Calcium 8.6 8.5 - 10.5 mg/dL    Correct Calcium 8.9 8.5 - 10.5 mg/dL    AST(SGOT) 19 12 - 45 U/L    ALT(SGPT) 10 2 - 50 U/L    Alkaline Phosphatase 207 (H) 30 - 99 U/L    Total Bilirubin 0.4 0.1 - 1.5 mg/dL    Albumin 3.6 3.2 - 4.9 g/dL    Total Protein 6.6 6.0 - 8.2 g/dL    Globulin 3.0 1.9 - 3.5 g/dL    A-G Ratio 1.2 g/dL   DIFFERENTIAL MANUAL    Collection Time: 01/18/24  5:45 AM   Result Value Ref Range    Manual Diff Status PERFORMED    PERIPHERAL SMEAR REVIEW    Collection Time: 01/18/24  5:45 AM   Result Value Ref Range    Peripheral Smear Review see below    PLATELET ESTIMATE    Collection Time: 01/18/24  5:45 AM   Result Value Ref Range    Plt Estimation Normal    MORPHOLOGY    Collection Time: 01/18/24  5:45 AM   Result Value Ref Range    RBC Morphology Present    ESTIMATED GFR    Collection Time: 01/18/24  5:45 AM   Result Value Ref Range    GFR (CKD-EPI) 140 >60 mL/min/1.73 m 2       Assessment:   39w6d/IOL-Pt s/p cytotec 25 mcg per vagina. Will re-evaluate at 10:30 am and likely start pitocin per protocol.  GBBS negative  T5GN-pmloemg glucose 78. Will recheck q 4 hours in latent labor  Fetal status-reassuring        Terri Doran M.D.

## 2024-01-18 NOTE — ANESTHESIA PREPROCEDURE EVALUATION
Date: 24  Procedure: Labor Epidural        [facility] pregnancy at 39 weeks gestation.    Relevant Problems   PULMONARY   (positive) Mild intermittent asthma       Physical Exam    Airway   Mallampati: II  TM distance: >3 FB  Neck ROM: full       Cardiovascular - normal exam  Rhythm: regular  Rate: normal  (-) murmur     Dental - normal exam           Pulmonary - normal exam  Breath sounds clear to auscultation     Abdominal    Neurological - normal exam                   Anesthesia Plan    ASA 2       Plan - epidural   Neuraxial block will be labor analgesia                  Pertinent diagnostic labs and testing reviewed    Informed Consent:    Anesthetic plan and risks discussed with patient.

## 2024-01-19 LAB
ERYTHROCYTE [DISTWIDTH] IN BLOOD BY AUTOMATED COUNT: 41.4 FL (ref 35.9–50)
HCT VFR BLD AUTO: 28.3 % (ref 37–47)
HGB BLD-MCNC: 8.6 G/DL (ref 12–16)
MCH RBC QN AUTO: 20.1 PG (ref 27–33)
MCHC RBC AUTO-ENTMCNC: 30.4 G/DL (ref 32.2–35.5)
MCV RBC AUTO: 66.3 FL (ref 81.4–97.8)
PLATELET # BLD AUTO: 196 K/UL (ref 164–446)
PMV BLD AUTO: 10.8 FL (ref 9–12.9)
RBC # BLD AUTO: 4.27 M/UL (ref 4.2–5.4)
WBC # BLD AUTO: 20.5 K/UL (ref 4.8–10.8)

## 2024-01-19 PROCEDURE — 36415 COLL VENOUS BLD VENIPUNCTURE: CPT

## 2024-01-19 PROCEDURE — 700102 HCHG RX REV CODE 250 W/ 637 OVERRIDE(OP): Performed by: OBSTETRICS & GYNECOLOGY

## 2024-01-19 PROCEDURE — A9270 NON-COVERED ITEM OR SERVICE: HCPCS | Performed by: OBSTETRICS & GYNECOLOGY

## 2024-01-19 PROCEDURE — 85027 COMPLETE CBC AUTOMATED: CPT

## 2024-01-19 PROCEDURE — 770002 HCHG ROOM/CARE - OB PRIVATE (112)

## 2024-01-19 RX ADMIN — DOCUSATE SODIUM 100 MG: 100 CAPSULE, LIQUID FILLED ORAL at 20:56

## 2024-01-19 RX ADMIN — Medication 1 TABLET: at 08:51

## 2024-01-19 RX ADMIN — IBUPROFEN 800 MG: 800 TABLET, FILM COATED ORAL at 20:56

## 2024-01-19 RX ADMIN — IBUPROFEN 800 MG: 800 TABLET, FILM COATED ORAL at 04:31

## 2024-01-19 ASSESSMENT — EDINBURGH POSTNATAL DEPRESSION SCALE (EPDS)
I HAVE BEEN ABLE TO LAUGH AND SEE THE FUNNY SIDE OF THINGS: AS MUCH AS I ALWAYS COULD
I HAVE BLAMED MYSELF UNNECESSARILY WHEN THINGS WENT WRONG: NOT VERY OFTEN
I HAVE BEEN SO UNHAPPY THAT I HAVE HAD DIFFICULTY SLEEPING: NOT AT ALL
I HAVE LOOKED FORWARD WITH ENJOYMENT TO THINGS: AS MUCH AS I EVER DID
I HAVE BEEN SO UNHAPPY THAT I HAVE BEEN CRYING: NO, NEVER
I HAVE FELT SCARED OR PANICKY FOR NO GOOD REASON: NO, NOT MUCH
I HAVE BEEN ANXIOUS OR WORRIED FOR NO GOOD REASON: HARDLY EVER
THINGS HAVE BEEN GETTING ON TOP OF ME: NO, MOST OF THE TIME I HAVE COPED QUITE WELL
I HAVE FELT SAD OR MISERABLE: NOT VERY OFTEN
THE THOUGHT OF HARMING MYSELF HAS OCCURRED TO ME: NEVER

## 2024-01-19 ASSESSMENT — PAIN DESCRIPTION - PAIN TYPE
TYPE: ACUTE PAIN

## 2024-01-19 NOTE — L&D DELIVERY NOTE
DATE OF SERVICE:  2024        ADMITTING DIAGNOSES:     1.  Intrauterine pregnancy at 39 weeks and 1 day.  2.  A1 diabetes.  3.  Group B strep negative.  4.  History of fetal pyelectasis, resolved.  5.  Iron deficiency anemia.  6.  Obesity.     PROCEDURES:     1.  Admission to labor and delivery.  2.  Cervical ripening with Cytotec 25 mcg per vagina one followed by Pitocin   up to 4 milliunits.  3.  Epidural.  4.  Normal spontaneous vaginal delivery of a vigorous male with Apgars 8 and   9.     DESCRIPTION OF PROCEDURE:  This patient is a 23-year-old  2, para 1   that was admitted at 39 weeks and 1 day for induction of labor.  She has A1   diabetes that was diagnosed at the end of the pregnancy based on a 1-hour that   was abnormal and a 3-hour that was abnormal.  When she came in, she was   closed, thick, and high.  She had Cytotec 25 mcg placed per vagina. Four hours   later, she was radha too much for another Cytotec.  Therefore, she was   started on Pitocin. At 12:50 p.m., she was 3 cm dilated, 50% effaced, -2   station.  Artificial rupture of membranes was performed and it was clear.  She   progressed through labor without any problems.  She was complete at 5:00 p.m.   and then allowed to labor down.  At 7:55 p.m., she was prepped and draped in   the usual sterile fashion.  I then assisted with a normal spontaneous vaginal   delivery of a vigorous male in STEVEN position.  The head was delivered   atraumatically and the rest of the infant delivered without any problems.    Mouth and nose were bulb suctioned.  Infant was placed on maternal abdomen.    Delayed cord was performed for 1 minute and then the cord was doubly clamped   and cut by the infant's father.  Cord gases were clamped and set aside.  The   placenta was then delivered intact. At 7:59 p.m., three-vessel cord was noted.    No cervical or vaginal lacerations were noted.  Uterus was firm and   hemostatic with an EBL of 300.  I did  place Cytotec at 100 mcg per rectum.    Uterus was firm.  This was again a vigorous male with Apgars 8 and 9.    Infant's weight is pending.  The patient's blood sugars were stable during   labor.        ______________________________  MD RO CHURCHILL/DANIA    DD:  01/18/2024 20:21  DT:  01/18/2024 20:38    Job#:  [id number]    CC:[facility]

## 2024-01-19 NOTE — PROGRESS NOTES
2245: Patient to room 310 via wheelchair infant in arms. Report received from RANCHO Cooper. IV patent running pitocin. Patient report cramping, medicated see MAR (1 gram tylenol for fever as well). Patient oriented to unit and room. Whiteboards updated, POC discussed. Call light within reach. Patient encouraged to call with any needs and or concerns.           2322: Dr. Doran notified of patients oral temp. Orders to give 1 gram of tylenol and to give 500 ML LR bolus. RN to call MD of spikes another temp.

## 2024-01-19 NOTE — PROGRESS NOTES
0850: Assessment complete. Plan of care discussed with patient. Patient encouraged to call with any needs.

## 2024-01-19 NOTE — L&D DELIVERY NOTE
Delivery note  , STEVEN, male with apgars 8 and 9. Placenta intact, 3 vc. No lacerations. EBL: 300.

## 2024-01-19 NOTE — PROGRESS NOTES
1725 - Assumed care of pt from Gisell VICKERS.   1753 - Dr. Doran on unit, pt began pushing with RANCHO. Dr. Doran in to assess.   1900 - Report given to Aakash VICKERS.

## 2024-01-19 NOTE — ANESTHESIA TIME REPORT
Anesthesia Start and Stop Event Times       Date Time Event    1/18/2024 1056 Ready for Procedure     1112 Anesthesia Start     1955 Anesthesia Stop          Responsible Staff  01/18/24      Name Role Begin End    Cole Hurt M.D. Anesth 1112 1955          Overtime Reason:  no overtime (within assigned shift)    Comments:

## 2024-01-19 NOTE — ANESTHESIA POSTPROCEDURE EVALUATION
Patient: Constantin Bobo    Procedure Summary       Date: 01/18/24 Room / Location:     Anesthesia Start: 1112 Anesthesia Stop: 1955    Procedure: Labor Epidural Diagnosis:     Scheduled Providers:  Responsible Provider: Cole Hurt M.D.    Anesthesia Type: epidural ASA Status: 2            Final Anesthesia Type: epidural  Last vitals  BP   Blood Pressure: 95/52    Temp   36.7 °C (98.1 °F)    Pulse   (!) 162   Resp   16    SpO2 96%         Anesthesia Post Evaluation    Patient location during evaluation: floor  Patient participation: complete - patient participated  Level of consciousness: awake and alert  Pain score: 0    Airway patency: patent  Anesthetic complications: no  Cardiovascular status: hemodynamically stable  Respiratory status: acceptable  Hydration status: euvolemic    PONV: none          No notable events documented.     Nurse Pain Score: 0 (NPRS)

## 2024-01-19 NOTE — LACTATION NOTE
This note was copied from a baby's chart.  Mom is a 24 y/o P2 who delivered baby boy weighing 7 # 10.8 oz at 39.6 wks. Mom reports breast changes during pregnancy. Mom denies any breast surgeries, thyroid or fertility issues.Mom irwin shave diet controlled GDM.   Mother states she breast fed her last child for about 5 months before her supply started to decrease. Baby was sleeping longer through night and began eating solids.  Mom reports this baby is doing well so far and she just fed about 45 minutes ago on one side. Baby is currently sleeping in mother's arms. Mom reports they will be doing baby's bath at 12 noon and she will attempt to feed when baby is warming while STS.  encouraged mom to call for assistance from lactation at that time mom reports some tenderness on her nipples and LC would like to assist in positioning and latching baby.  DANIEL reviewed demand feeds of 8 or more times in 24 hours an observing for feeding cues  Mom reports she has a breast pump at home and was planning on enrolling in WIC so DANIEL sent referral to [facility].    [facility] - Follow up support. Baby is ready for feeding. LC changed a wet diaper prior to feeding. Mom sat upright in FB hold and pillow support. Baby was rooting towards breast but was unable to sustain any latch. Nipples are very short and pseudo inverted. Mom told LC that she used a Nipple Shield with her first child for 2 months before switching to pumping and providing until 5 months of age. LC applied a 24 mm Nipple Shield and baby was able to latch deeply and mother reports that the latch is deeper and stronger than before. Baby settled into a rhythmic suckling pattern and self started after a short pause.   DANIEL reviewed feeding plan and to start a feed when seeing early cues. Mom will use a manual pump after feedings and if still needing  to use the nipple shield in to the evening an electric pump will be started and mom will fed back any colostrum. DANIEL gave report to bedside RN  and she is aware of POC    LC had FOB bring up the  [facility] video for viewing. LC encouraged mom to remain STS with baby between feeds and express colostrum on to nipples to entice a feeding and always attempt latch with the NS.  LC recommended that mom call RN/LC for assist with future feeds as needed.

## 2024-01-19 NOTE — CARE PLAN
The patient is Stable - Low risk of patient condition declining or worsening    Shift Goals  Clinical Goals: VSS, Fundus firm with light  Patient Goals:   Family Goals:     Progress made toward(s) clinical / shift goals:  Fundus firm with light lochia. Patient voiding. Educated on when to pull emergency call light.     Patient is not progressing towards the following goals: Patient with temp and tachy. MD aware. Treated with 1 gram of tylenol and bolus. Will continue to monitor. Patient states she feels fine.

## 2024-01-19 NOTE — PROGRESS NOTES
Post Partum Progress Note    Name:   Constantin Bobo   Date/Time:  1/19/2024 - 7:30 AM  Chief Admitting Dx:  Pregnancy [Z34.90]  Indication for care in labor or delivery [O75.9]  Delivery Type:  vaginal, spontaneous  Post-Op/Post Partum Days #:  1    Subjective:  Pt is working on breastfeeding and wants to stay another day. Pt with minimal lochia.     Vitals:    01/18/24 2300 01/19/24 0015 01/19/24 0200 01/19/24 0600   BP: 114/63  111/73 96/62   Pulse: (!) 106  92 82   Resp: 18 18 18   Temp: (!) 38.5 °C (101.3 °F) 37.7 °C (99.8 °F) 37.6 °C (99.6 °F) 36.8 °C (98.3 °F)   TempSrc: Oral Oral Oral Temporal   SpO2: 98%  96% 93%   Weight:       Height:           Exam:  Gen: NAD  Abdomen: Abdomen soft, non-tender. BS normal. No masses,  No organomegaly  Fundal Tenderness:  no  Fundus Firm: yes  Below umbilicus: yes  Lochia: mild  Extremities: 1+ edema extremities, peripheral pulses and reflexes normal    Meds:  Current Facility-Administered Medications   Medication Dose    oxytocin (Pitocin) infusion (for post delivery)  125 mL/hr    lactated ringers infusion      docusate sodium (Colace) capsule 100 mg  100 mg    ibuprofen (Motrin) tablet 800 mg  800 mg    acetaminophen (Tylenol) tablet 1,000 mg  1,000 mg    tetanus-dipth-acell pertussis (Tdap) inj 0.5 mL  0.5 mL    measles, mumps and rubella vaccine (Mmr) injection 0.5 mL  0.5 mL    PRN oxytocin (PITOCIN) (20 Units/1000 mL) PRN for excessive uterine bleeding - See Admin Instr  125-999 mL/hr    miSOPROStol (Cytotec) tablet 600 mcg  600 mcg    methylergonovine (Methergine) injection 0.2 mg  0.2 mg    carboPROST (Hemabate) injection 250 mcg  250 mcg    acetaminophen (Tylenol) tablet 1,000 mg  1,000 mg       Labs:   Recent Labs     01/18/24  0545 01/19/24  0348   WBC 7.4 20.5*   RBC 4.96 4.27   HEMOGLOBIN 9.9* 8.6*   HEMATOCRIT 33.6* 28.3*   MCV 67.7* 66.3*   MCH 20.0* 20.1*   MCHC 29.5* 30.4*   RDW 41.5 41.4   PLATELETCT 251 196   MPV 11.3 10.8        Assessment/Plan:  Chief Admitting Dx:  Pregnancy [Z34.90]  Indication for care in labor or delivery [O75.9]  Delivery Type:  vaginal, spontaneous  Postpartum-Continue routine post partum care.  D/c tomorrow am.   Anemia-asymptomatic, start ferrous sulfate 325 mg one tab bid.   Single elevated temperature elevation-resolved with Tylenol.     Terri Doran M.D.

## 2024-01-19 NOTE — PROGRESS NOTES
- received report from RANCHO Hickman , 39/6, patient currently complete and pushing.  RN continuously at beside coaching on pushing technique.   - delivery of male infant, 8/9 APGARS  2215- patient able to ambulate to  in stable condition.  Bleeding WNL, Vitals WNL.   - transferred to  in stable condition  - report given to RANCHO Barnett.  - bands checked x2.

## 2024-01-19 NOTE — PROGRESS NOTES
"Labor Progress Note    Constantin Bobo   39w6d      Subjective:  Uterine contractions:yes  Pain: Yes. Severity: mild    Objective:   Vitals:    01/18/24 0526 01/18/24 0600 01/18/24 0608 01/18/24 0952   BP: (!) 146/52  125/73    Pulse: (!) 116  (!) 103    Resp: 16      Temp: 37.1 °C (98.7 °F)   36.7 °C (98 °F)   TempSrc: Temporal   Temporal   Weight:  93.4 kg (206 lb)     Height:  1.575 m (5' 2\")       FHT: 140's cat 1  [place]: q 2-4  SVE:C/C/O  Membranes ruptured: .yes    Meds:   Epidural : .yes  Pitocin: .yes, 4 mu    Labs:  Recent Results (from the past 24 hour(s))   Hold Blood Bank Specimen (Not Tested)    Collection Time: 01/18/24  5:45 AM   Result Value Ref Range    Holding Tube - Bb DONE    CBC with differential    Collection Time: 01/18/24  5:45 AM   Result Value Ref Range    WBC 7.4 4.8 - 10.8 K/uL    RBC 4.96 4.20 - 5.40 M/uL    Hemoglobin 9.9 (L) 12.0 - 16.0 g/dL    Hematocrit 33.6 (L) 37.0 - 47.0 %    MCV 67.7 (L) 81.4 - 97.8 fL    MCH 20.0 (L) 27.0 - 33.0 pg    MCHC 29.5 (L) 32.2 - 35.5 g/dL    RDW 41.5 35.9 - 50.0 fL    Platelet Count 251 164 - 446 K/uL    MPV 11.3 9.0 - 12.9 fL    Neutrophils-Polys 72.80 (H) 44.00 - 72.00 %    Lymphocytes 23.70 22.00 - 41.00 %    Monocytes 3.50 0.00 - 13.40 %    Eosinophils 0.00 0.00 - 6.90 %    Basophils 0.00 0.00 - 1.80 %    Nucleated RBC 0.00 0.00 - 0.20 /100 WBC    Neutrophils (Absolute) 5.39 1.82 - 7.42 K/uL    Lymphs (Absolute) 1.75 1.00 - 4.80 K/uL    Monos (Absolute) 0.26 0.00 - 0.85 K/uL    Eos (Absolute) 0.00 0.00 - 0.51 K/uL    Baso (Absolute) 0.00 0.00 - 0.12 K/uL    NRBC (Absolute) 0.00 K/uL    Anisocytosis 1+     Microcytosis 2+ (A)    T.PALLIDUM AB RENA (Syphilis)    Collection Time: 01/18/24  5:45 AM   Result Value Ref Range    Syphilis, Treponemal Qual Non-Reactive Non-Reactive   Comp Metabolic Panel    Collection Time: 01/18/24  5:45 AM   Result Value Ref Range    Sodium 137 135 - 145 mmol/L    Potassium 4.1 3.6 - 5.5 mmol/L    Chloride 109 96 - " 112 mmol/L    Co2 15 (L) 20 - 33 mmol/L    Anion Gap 13.0 7.0 - 16.0    Glucose 78 65 - 99 mg/dL    Bun 7 (L) 8 - 22 mg/dL    Creatinine 0.45 (L) 0.50 - 1.40 mg/dL    Calcium 8.6 8.5 - 10.5 mg/dL    Correct Calcium 8.9 8.5 - 10.5 mg/dL    AST(SGOT) 19 12 - 45 U/L    ALT(SGPT) 10 2 - 50 U/L    Alkaline Phosphatase 207 (H) 30 - 99 U/L    Total Bilirubin 0.4 0.1 - 1.5 mg/dL    Albumin 3.6 3.2 - 4.9 g/dL    Total Protein 6.6 6.0 - 8.2 g/dL    Globulin 3.0 1.9 - 3.5 g/dL    A-G Ratio 1.2 g/dL   DIFFERENTIAL MANUAL    Collection Time: 24  5:45 AM   Result Value Ref Range    Manual Diff Status PERFORMED    PERIPHERAL SMEAR REVIEW    Collection Time: 24  5:45 AM   Result Value Ref Range    Peripheral Smear Review see below    PLATELET ESTIMATE    Collection Time: 24  5:45 AM   Result Value Ref Range    Plt Estimation Normal    MORPHOLOGY    Collection Time: 24  5:45 AM   Result Value Ref Range    RBC Morphology Present    ESTIMATED GFR    Collection Time: 24  5:45 AM   Result Value Ref Range    GFR (CKD-EPI) 140 >60 mL/min/1.73 m 2   Type & Screen - Ip Obstetric Panel-BB (Aborh/Antibody Screen)    Collection Time: 24  5:45 AM   Result Value Ref Range    ABO Grouping Only AB     Rh Grouping Only POS     Antibody Screen Scrn NEG    POCT glucose device results    Collection Time: 24  9:56 AM   Result Value Ref Range    POC Glucose, Blood 71 65 - 99 mg/dL       Assessment:   39w6d/complete-labor down and then push. Exp .  GBBS neg  A1DM-normal blood glucose.   Fetal status-reassuring      Terri Doran M.D.

## 2024-01-20 ENCOUNTER — PHARMACY VISIT (OUTPATIENT)
Dept: PHARMACY | Facility: MEDICAL CENTER | Age: 24
End: 2024-01-20
Payer: COMMERCIAL

## 2024-01-20 VITALS
BODY MASS INDEX: 37.91 KG/M2 | HEIGHT: 62 IN | DIASTOLIC BLOOD PRESSURE: 66 MMHG | HEART RATE: 89 BPM | RESPIRATION RATE: 10 BRPM | SYSTOLIC BLOOD PRESSURE: 102 MMHG | TEMPERATURE: 97.5 F | WEIGHT: 206 LBS | OXYGEN SATURATION: 95 %

## 2024-01-20 PROBLEM — D62 ANEMIA ASSOCIATED WITH ACUTE BLOOD LOSS: Status: ACTIVE | Noted: 2024-01-20

## 2024-01-20 PROCEDURE — A9270 NON-COVERED ITEM OR SERVICE: HCPCS | Performed by: OBSTETRICS & GYNECOLOGY

## 2024-01-20 PROCEDURE — RXMED WILLOW AMBULATORY MEDICATION CHARGE: Performed by: OBSTETRICS & GYNECOLOGY

## 2024-01-20 PROCEDURE — 700102 HCHG RX REV CODE 250 W/ 637 OVERRIDE(OP): Performed by: OBSTETRICS & GYNECOLOGY

## 2024-01-20 RX ORDER — ACETAMINOPHEN 500 MG
1000 TABLET ORAL EVERY 6 HOURS PRN
Qty: 30 TABLET | Refills: 0 | COMMUNITY
Start: 2024-01-20

## 2024-01-20 RX ORDER — IBUPROFEN 200 MG
200-600 TABLET ORAL EVERY 6 HOURS PRN
COMMUNITY
Start: 2024-01-20

## 2024-01-20 RX ADMIN — DOCUSATE SODIUM 100 MG: 100 CAPSULE, LIQUID FILLED ORAL at 07:40

## 2024-01-20 RX ADMIN — ACETAMINOPHEN 1000 MG: 500 TABLET ORAL at 01:46

## 2024-01-20 RX ADMIN — Medication 1 TABLET: at 07:40

## 2024-01-20 RX ADMIN — IBUPROFEN 800 MG: 800 TABLET, FILM COATED ORAL at 05:34

## 2024-01-20 ASSESSMENT — PAIN DESCRIPTION - PAIN TYPE
TYPE: ACUTE PAIN

## 2024-01-20 NOTE — LACTATION NOTE
This note was copied from a baby's chart.  Multip Mom says that she was able to BF her first child for about 5 months, then baby started eating solids/ABM ad her milk supply decreased. She has been pumping with her own pump and feeding mixed feedings with her new baby because her nipples are too sore. She has nipple cream for use, and LC educated her to use breast milk too prn. Mom is not sure if she wants to work on breast feeding now, but after talking, and baby showing feeding cues, she did want to try. She has linear inversions on each nipple tip. LC educated her that this can cause some tenderness with initial latching, but may resolve as feeding progressed. Taught HE to aid with let down prior to latching, Mom has expressible colostrum. Provided spoons to feed colostrum back to baby. Taught proper deep asymmetric latching techniques, used football hold on the right, with good pillow supports/wedges for comfort and safety. Mom expresses better comfort with latch. Baby started to get a little fussy, LC showed FOB how to dribble some ABM from syringe on nipple to entice baby to feed at breast. Baby does maintain latch for several minutes. Showed Mom how to transition to laid back position for biocentered nursing. Mom and baby comfortable and happy. L-7. Reviewed that it will take about 72 hours for her full milk supply to increase. Mom/Dad wish to continue to do combo feeding-provided and taught feeding volume GL for q 3 hours. Encouraged Mom to keep baby STS ad BF ad madisyn per baby feeding cues first, minimum of 10 times daily. Wake to feed if greater than 2 hours during the day, or 3-4 hours during the night since previous BF. Provided written educational handouts. Encouraged parents to call for additional LC assistance prn.

## 2024-01-20 NOTE — DISCHARGE SUMMARY
Discharge Summary:      Constantin Bobo    Admit Date:   2024  Discharge Date:  2024     Admitting diagnosis:  Pregnancy [Z34.90]  Indication for care in labor or delivery [O75.9]  Discharge Diagnosis: Status post vaginal, spontaneous.  Pregnancy Complications:     ADMITTING DIAGNOSES:     1.  Intrauterine pregnancy at 39 weeks and 1 day.  2.  A1 diabetes.  3.  Group B strep negative.  4.  History of fetal pyelectasis, resolved.  5.  Iron deficiency anemia.  6.  Obesity.     PROCEDURES:     1.  Admission to labor and delivery.  2.  Cervical ripening with Cytotec 25 mcg per vagina one followed by Pitocin   up to 4 milliunits.  3.  Epidural.  4.  Normal spontaneous vaginal delivery of a vigorous male with Apgars 8 and           Tubal Ligation:  no        History:  Past Medical History:   Diagnosis Date    Mild intermittent asthma 2015    Myopia of both eyes 2015     OB History    Para Term  AB Living   4 2 2 0 1 2   SAB IAB Ectopic Molar Multiple Live Births   1 0 0   0 2      # Outcome Date GA Lbr Dev/2nd Weight Sex Delivery Anes PTL Lv   4 Term 24 39w6d / 02:53 3.48 kg (7 lb 10.8 oz) M Vag-Spont EPI N PAULA   3 Term 20 40w0d   M    PAULA   2 SAB            1                  Seasonal  Patient Active Problem List    Diagnosis Date Noted    Labor and delivery indication for care or intervention 2024    Anemia associated with acute blood loss 2024    Acute recurrent maxillary sinusitis 2023    Depression 2020    Microcytic anemia 2020    IUP (intrauterine pregnancy), incidental 2019    Mild intermittent asthma 2015    Myopia of both eyes 2015        Hospital Course:   23 y.o. , now para 2, was admitted with the above mentioned diagnosis, underwent Induction of Labor, vaginal, spontaneous. Patient postpartum course was unremarkable, with progressive advancement in diet , ambulation and toleration of oral  analgesia. Patient without complaints today and desires discharge.      Vitals:    01/19/24 1800 01/19/24 2200 01/20/24 0200 01/20/24 0600   BP: 117/73 106/69 100/57 96/57   Pulse: 86 79 81 83   Resp: 18 18 18 18   Temp: 36.8 °C (98.3 °F) 36.6 °C (97.8 °F) 36.7 °C (98 °F) 36.8 °C (98.3 °F)   TempSrc: Temporal Temporal Temporal Temporal   SpO2: 97% 96% 96% 96%   Weight:       Height:           Current Facility-Administered Medications   Medication Dose    ferrous sulfate-c-folic acid (Folitab 500) tablet 1 Tablet  1 Tablet    oxytocin (Pitocin) infusion (for post delivery)  125 mL/hr    lactated ringers infusion      docusate sodium (Colace) capsule 100 mg  100 mg    ibuprofen (Motrin) tablet 800 mg  800 mg    acetaminophen (Tylenol) tablet 1,000 mg  1,000 mg    tetanus-dipth-acell pertussis (Tdap) inj 0.5 mL  0.5 mL    measles, mumps and rubella vaccine (Mmr) injection 0.5 mL  0.5 mL    PRN oxytocin (PITOCIN) (20 Units/1000 mL) PRN for excessive uterine bleeding - See Admin Instr  125-999 mL/hr    miSOPROStol (Cytotec) tablet 600 mcg  600 mcg    methylergonovine (Methergine) injection 0.2 mg  0.2 mg    carboPROST (Hemabate) injection 250 mcg  250 mcg       Exam:  Breast Exam: Inspection negative. No nipple discharge or bleeding. No masses or nodularity palpable  Abdomen: Abdomen soft, non-tender. BS normal. No masses,  No organomegaly  Fundus Non Tender: yes  Incision: none  Perineum: perineum intact  Extremity: extremities, peripheral pulses and reflexes normal     Labs:  Recent Labs     01/18/24  0545 01/19/24  0348   WBC 7.4 20.5*   RBC 4.96 4.27   HEMOGLOBIN 9.9* 8.6*   HEMATOCRIT 33.6* 28.3*   MCV 67.7* 66.3*   MCH 20.0* 20.1*   MCHC 29.5* 30.4*   RDW 41.5 41.4   PLATELETCT 251 196   MPV 11.3 10.8        Activity:   Discharge to home  Pelvic Rest x 6 weeks    Assessment:  Course complicated by blood-loss anemia  Discharge Assessment: Voiding without difficulty     Follow up: Dr Doran, 6 weeks.       Discharge Meds:   Current Outpatient Medications   Medication Sig Dispense Refill    acetaminophen (TYLENOL) 500 MG Tab Take 2 Tablets by mouth every 6 hours as needed for Mild Pain or Moderate Pain. 30 Tablet 0    ferrous sulfate-c-folic acid (FOLITAB 500) 105-500-0.8 MG Tab CR Take 1 Tablet by mouth every day for 20 days. 20 Tablet 0    ibuprofen (MOTRIN) 200 MG Tab Take 1-3 Tablets by mouth every 6 hours as needed for Moderate Pain or Mild Pain.         Alexys Andersen M.D.

## 2024-01-20 NOTE — PROGRESS NOTES
Patient assessment completed. Discussed pain management plan and patient requests pain medication be offered as available. Patient denies dizziness and headaches; states she is voiding w/o difficulty and passing flatus. POC discussed, all questions answered. Pt and partner report all needs are met at this time.

## 2024-01-20 NOTE — PROGRESS NOTES
0740- patient assessment done.  Condition will continue to be monitored.     1130- patient states that she understands all discharge instructions and has no questions at this time.    2019 novel coronavirus disease (COVID-19)

## 2024-01-20 NOTE — PROGRESS NOTES
POSTPARTUM DAY 2    ADMITTING DIAGNOSES:     1.  Intrauterine pregnancy at 39 weeks and 1 day.  2.  A1 diabetes.  3.  Group B strep negative.  4.  History of fetal pyelectasis, resolved.  5.  Iron deficiency anemia.  6.  Obesity.     PROCEDURES:     1.  Admission to labor and delivery.  2.  Cervical ripening with Cytotec 25 mcg per vagina one followed by Pitocin   up to 4 milliunits.  3.  Epidural.  4.  Normal spontaneous vaginal delivery of a vigorous male with Apgars 8 and         No complaints.  Patient is doing well with infant care and lactation issues.  Patient is voiding well.    PE:    Afebrile  BP normal    Uterus involuting appropriately, nontender  Perineum:  No perineal complaints, so I didn't do specific perineal exam.  Calves nontender, Jessi negative bilaterally.     LAB:         01/18/24  0545 1/19/24  0348   WBC 7.4 20.5*   HEMOGLOBIN 9.9* 8.6*   HEMATOCRIT 33.6* 28.3*   PLATELETCT 251 196        PLAN:      PNV, FeSO4.   Postpartum care.  Lactation consult.  Patient desires discharge:  today  .    Prescriptions:   PNV, FoliTab 500 daily x 3 weeks, OTC analgesics PRN .  Followup plans:   6 weeks .

## 2024-01-20 NOTE — CARE PLAN
The patient is Stable - Low risk of patient condition declining or worsening    Shift Goals  Clinical Goals: VSS  Patient Goals: healthy baby  Family Goals: emotional support    Progress made toward(s) clinical / shift goals:  VSS     Problem: Infection - Postpartum  Goal: Postpartum patient will be free of signs and symptoms of infection  Outcome: Progressing  NOTE: Patient is afebrile and absent for other signs/symptoms of infection. Vital signs WDL.       Problem: Altered Physiologic Condition  Goal: Patient physiologically stable as evidenced by normal lochia, palpable uterine involution and vitals within normal limits  Outcome: Progressing  NOTE: Patient is physiologically stable as evidenced by scant to light lochia rubra, firm fundus at the umbilicus, and vital signs WDL.      Patient is not progressing towards the following goals: NA

## 2024-01-20 NOTE — DISCHARGE INSTRUCTIONS

## 2024-08-13 ENCOUNTER — PATIENT MESSAGE (OUTPATIENT)
Dept: HEALTH INFORMATION MANAGEMENT | Facility: OTHER | Age: 24
End: 2024-08-13

## 2024-09-24 ENCOUNTER — TELEPHONE (OUTPATIENT)
Dept: HEALTH INFORMATION MANAGEMENT | Facility: OTHER | Age: 24
End: 2024-09-24
Payer: COMMERCIAL

## 2024-11-27 ENCOUNTER — OFFICE VISIT (OUTPATIENT)
Dept: URGENT CARE | Facility: PHYSICIAN GROUP | Age: 24
End: 2024-11-27
Payer: COMMERCIAL

## 2024-11-27 ENCOUNTER — HOSPITAL ENCOUNTER (OUTPATIENT)
Dept: LAB | Facility: MEDICAL CENTER | Age: 24
End: 2024-11-27
Attending: PHYSICIAN ASSISTANT
Payer: COMMERCIAL

## 2024-11-27 VITALS
WEIGHT: 187.39 LBS | OXYGEN SATURATION: 99 % | RESPIRATION RATE: 18 BRPM | SYSTOLIC BLOOD PRESSURE: 116 MMHG | HEIGHT: 62 IN | HEART RATE: 104 BPM | TEMPERATURE: 98.6 F | DIASTOLIC BLOOD PRESSURE: 82 MMHG | BODY MASS INDEX: 34.48 KG/M2

## 2024-11-27 DIAGNOSIS — R19.7 DIARRHEA, UNSPECIFIED TYPE: ICD-10-CM

## 2024-11-27 DIAGNOSIS — K21.9 GASTROESOPHAGEAL REFLUX DISEASE, UNSPECIFIED WHETHER ESOPHAGITIS PRESENT: ICD-10-CM

## 2024-11-27 DIAGNOSIS — R11.2 NAUSEA AND VOMITING, UNSPECIFIED VOMITING TYPE: ICD-10-CM

## 2024-11-27 LAB
EST. AVERAGE GLUCOSE BLD GHB EST-MCNC: 108 MG/DL
HBA1C MFR BLD: 5.4 % (ref 4–5.6)
LIPASE SERPL-CCNC: 42 U/L (ref 11–82)
TSH SERPL DL<=0.005 MIU/L-ACNC: 1.35 UIU/ML (ref 0.38–5.33)

## 2024-11-27 PROCEDURE — 83690 ASSAY OF LIPASE: CPT

## 2024-11-27 PROCEDURE — 99214 OFFICE O/P EST MOD 30 MIN: CPT | Performed by: PHYSICIAN ASSISTANT

## 2024-11-27 PROCEDURE — 83036 HEMOGLOBIN GLYCOSYLATED A1C: CPT

## 2024-11-27 PROCEDURE — 36415 COLL VENOUS BLD VENIPUNCTURE: CPT

## 2024-11-27 PROCEDURE — 3079F DIAST BP 80-89 MM HG: CPT | Performed by: PHYSICIAN ASSISTANT

## 2024-11-27 PROCEDURE — 84443 ASSAY THYROID STIM HORMONE: CPT

## 2024-11-27 PROCEDURE — 3074F SYST BP LT 130 MM HG: CPT | Performed by: PHYSICIAN ASSISTANT

## 2024-11-27 RX ORDER — LOPERAMIDE HYDROCHLORIDE 2 MG/1
2 CAPSULE ORAL 4 TIMES DAILY PRN
Qty: 15 CAPSULE | Refills: 0 | Status: SHIPPED | OUTPATIENT
Start: 2024-11-27

## 2024-11-27 ASSESSMENT — ENCOUNTER SYMPTOMS
NAUSEA: 1
COUGH: 0
RESPIRATORY NEGATIVE: 1
CONSTIPATION: 0
FLANK PAIN: 0
SHORTNESS OF BREATH: 0
DIZZINESS: 0
ABDOMINAL PAIN: 0
FEVER: 0
CHILLS: 0
CARDIOVASCULAR NEGATIVE: 1
CONSTITUTIONAL NEGATIVE: 1
HEARTBURN: 1
HEADACHES: 0
VOMITING: 1
DIARRHEA: 1
BLOOD IN STOOL: 0
WHEEZING: 0
DIAPHORESIS: 0

## 2024-11-27 ASSESSMENT — FIBROSIS 4 INDEX: FIB4 SCORE: 0.67

## 2024-11-27 NOTE — PROGRESS NOTES
Subjective:     Constantin Bobo  is a 24 y.o. female who presents for GI Problem (Stomach bug vomiting for 2x weeks )       She presents today with nausea, vomiting and diarrhea that has been ongoing for the last 2 weeks.  She states that the nausea and vomiting does occur intermittently, roughly every 3 days, while the diarrhea is consistent each day.  On days where she is experiencing vomiting she does vomit roughly 5 times per day, notes having diarrhea 5-6 times per day.  No blood in the vomit or stool.  She has been evaluated in [facility] emergency department and an emergency department in [place] for the symptoms, workup at that time was negative.  She was prescribed Zofran during 1 of these visits which did provide short-term relief.  She has continue to follow with a bland diet.  She also notes having chronic acid reflux-like symptoms with a burning sensation in her upper chest, states that this burning sensation does worsen prior to onset of vomiting episodes.  She denies any fevers, no chest pain or shortness of breath, no severe abdominal pain.  Has not used any over-the-counter medications for symptoms.  Denies any urinary tract symptoms, no vaginal pain bleeding or discharge.  Last menstrual cycle did end last week       Review of Systems   Constitutional: Negative.  Negative for chills, diaphoresis, fever and malaise/fatigue.   Respiratory: Negative.  Negative for cough, shortness of breath and wheezing.    Cardiovascular: Negative.  Negative for chest pain.   Gastrointestinal:  Positive for diarrhea, heartburn, nausea and vomiting. Negative for abdominal pain, blood in stool and constipation.   Genitourinary:  Negative for dysuria, flank pain, frequency, hematuria and urgency.   Neurological:  Negative for dizziness and headaches.      Allergies   Allergen Reactions    Seasonal Anxiety, Cough, Itching, Runny Nose and Shortness of Breath     Past Medical History:   Diagnosis Date     "Mild intermittent asthma 11/16/2015    Myopia of both eyes 11/16/2015        Objective:   /82 (BP Location: Left arm, Patient Position: Sitting, BP Cuff Size: Adult)   Pulse (!) 104   Temp 37 °C (98.6 °F) (Temporal)   Resp 18   Ht 1.575 m (5' 2\")   Wt 85 kg (187 lb 6.3 oz)   SpO2 99%   BMI 34.27 kg/m²   Physical Exam  Vitals and nursing note reviewed.   Constitutional:       General: She is not in acute distress.     Appearance: Normal appearance. She is not ill-appearing, toxic-appearing or diaphoretic.   HENT:      Head: Normocephalic.      Right Ear: Tympanic membrane, ear canal and external ear normal. There is no impacted cerumen.      Left Ear: Tympanic membrane, ear canal and external ear normal. There is no impacted cerumen.      Nose: No congestion or rhinorrhea.      Mouth/Throat:      Mouth: Mucous membranes are moist.      Pharynx: No oropharyngeal exudate or posterior oropharyngeal erythema.   Eyes:      General: No scleral icterus.        Right eye: No discharge.         Left eye: No discharge.      Conjunctiva/sclera: Conjunctivae normal.   Cardiovascular:      Rate and Rhythm: Normal rate and regular rhythm.   Pulmonary:      Effort: Pulmonary effort is normal. No respiratory distress.      Breath sounds: Normal breath sounds. No stridor. No wheezing or rhonchi.   Abdominal:      General: Abdomen is flat. There is no distension.      Palpations: Abdomen is soft.      Tenderness: There is no abdominal tenderness. There is no right CVA tenderness, left CVA tenderness or guarding.      Comments: No tenderness on exam.  Bowel sounds hyperactive in all quadrants.   Musculoskeletal:      Cervical back: Neck supple.   Lymphadenopathy:      Cervical: No cervical adenopathy.   Neurological:      General: No focal deficit present.      Mental Status: She is alert and oriented to person, place, and time.   Psychiatric:         Mood and Affect: Mood normal.         Behavior: Behavior normal.         " Thought Content: Thought content normal.         Judgment: Judgment normal.             Diagnostic testing:    TSH with free T4, hemoglobin A1c, lipase-pending    Stool culture, ova and parasite-pending    Assessment/Plan:     Encounter Diagnoses   Name Primary?    Nausea and vomiting, unspecified vomiting type     Diarrhea, unspecified type     Gastroesophageal reflux disease, unspecified whether esophagitis present           Plan for care for today's complaint includes evaluating patient's ongoing nausea, vomiting and diarrhea symptoms with TSH with free T4, hemoglobin A1c and lipase test.  Also obtained stool culture and ova and parasite testing.  Will contact the patient via [employer] message to discuss the results of the testing obtained today, will adjust treatment plan accordingly.  Withheld from CBC and CMP testing today as she has had 2 previous emergency room visits over the last 2 weeks for evaluation of recurrent symptoms, workup at that time was negative during both visits.  We will trial omeprazole and Imodium for symptom support; patient is no longer breast-feeding.  Continue with brat diet.  Encouraged probiotic over-the-counter intake.  Overall patient was well-appearing.  Referral placed to primary care, continue to monitor symptoms and return to urgent care or follow-up with primary care provider if symptoms remain ongoing.  Follow-up in the emergency department if symptoms become severe, ER precautions discussed in office today..  Prescription for omeprazole, Imodium provided.    See AVS Instructions below for written guidance provided to patient on after-visit management and care in addition to our verbal discussion during the visit.    Please note that this dictation was created using voice recognition software. I have attempted to correct all errors, but there may be sound-alike, spelling, grammar and possibly content errors that I did not discover before finalizing the note.    Tim Man  KEN

## 2025-01-21 ENCOUNTER — TELEPHONE (OUTPATIENT)
Dept: SCHEDULING | Facility: IMAGING CENTER | Age: 25
End: 2025-01-21
Payer: COMMERCIAL

## 2025-01-23 ENCOUNTER — TELEPHONE (OUTPATIENT)
Dept: HEALTH INFORMATION MANAGEMENT | Facility: OTHER | Age: 25
End: 2025-01-23
Payer: COMMERCIAL

## 2025-02-07 ENCOUNTER — APPOINTMENT (OUTPATIENT)
Dept: MEDICAL GROUP | Facility: CLINIC | Age: 25
End: 2025-02-07
Attending: PHYSICIAN ASSISTANT
Payer: COMMERCIAL

## 2025-03-20 ENCOUNTER — OFFICE VISIT (OUTPATIENT)
Dept: MEDICAL GROUP | Facility: PHYSICIAN GROUP | Age: 25
End: 2025-03-20
Payer: COMMERCIAL

## 2025-03-20 VITALS
HEIGHT: 62 IN | HEART RATE: 93 BPM | DIASTOLIC BLOOD PRESSURE: 82 MMHG | WEIGHT: 194 LBS | SYSTOLIC BLOOD PRESSURE: 120 MMHG | TEMPERATURE: 97 F | BODY MASS INDEX: 35.7 KG/M2 | OXYGEN SATURATION: 98 %

## 2025-03-20 DIAGNOSIS — K21.9 GASTROESOPHAGEAL REFLUX DISEASE, UNSPECIFIED WHETHER ESOPHAGITIS PRESENT: ICD-10-CM

## 2025-03-20 DIAGNOSIS — R14.0 BLOATING: ICD-10-CM

## 2025-03-20 DIAGNOSIS — R19.5 LOOSE STOOLS: ICD-10-CM

## 2025-03-20 PROCEDURE — 3074F SYST BP LT 130 MM HG: CPT | Performed by: STUDENT IN AN ORGANIZED HEALTH CARE EDUCATION/TRAINING PROGRAM

## 2025-03-20 PROCEDURE — 99214 OFFICE O/P EST MOD 30 MIN: CPT | Performed by: STUDENT IN AN ORGANIZED HEALTH CARE EDUCATION/TRAINING PROGRAM

## 2025-03-20 PROCEDURE — 3079F DIAST BP 80-89 MM HG: CPT | Performed by: STUDENT IN AN ORGANIZED HEALTH CARE EDUCATION/TRAINING PROGRAM

## 2025-03-20 RX ORDER — LOPERAMIDE HYDROCHLORIDE 2 MG/1
2 CAPSULE ORAL 4 TIMES DAILY PRN
Qty: 56 CAPSULE | Refills: 0 | Status: SHIPPED | OUTPATIENT
Start: 2025-03-20 | End: 2025-04-03

## 2025-03-20 RX ORDER — PANTOPRAZOLE SODIUM 40 MG/1
TABLET, DELAYED RELEASE ORAL
Qty: 90 TABLET | Refills: 0 | Status: SHIPPED | OUTPATIENT
Start: 2025-03-20

## 2025-03-20 ASSESSMENT — ENCOUNTER SYMPTOMS
FEVER: 0
PALPITATIONS: 0
SHORTNESS OF BREATH: 0
CHILLS: 0

## 2025-03-20 ASSESSMENT — FIBROSIS 4 INDEX: FIB4 SCORE: 0.67

## 2025-03-20 NOTE — PROGRESS NOTES
Subjective:   Verbal consent was acquired by the patient to use [employer] ambient listening note generation during this visit Yes     CC: Loose stools, GERD, and bloating    History of Present Illness  Ms. Bobo is a pleasant 23 yo who presents for evaluation of gastrointestinal issues.    She has been experiencing persistent gastrointestinal disturbances since 11/2024, characterized by recurrent episodes of vomiting, severe heartburn, stomach cramps, and daily morning diarrhea. She has sought emergency care on three occasions due to unrelenting vomiting, but the cause remains undetermined. Despite normal blood work and pregnancy test results, her symptoms persist. She reports waking up in the middle of the night to vomit due to severe heartburn. She experiences stomach cramps and diarrhea almost every morning. She does not have a PCP that she follows with. She was prescribed omeprazole, but it did not alleviate her symptoms. She is not currently taking any medications. She reports no recent international travel. She is not breastfeeding. She has attempted dietary modifications, including the elimination of spicy and acidic foods, and has experienced weight loss since the onset of these symptoms. She experiences bloating and cramping several times a week, often after meals, and has resorted to consuming small portions due to the associated heartburn. She was previously prescribed loperamide for suspected gastroenteritis at an urgent care visit a few months ago.    Her heartburn symptoms are most pronounced at night, often disrupting her sleep with nausea or vomiting within an hour or two of falling asleep. She has tried Pepcid and Tums for her heartburn.    MEDICATIONS  Past: Omeprazole, Pepcid, Tums    Patient Active Problem List    Diagnosis Date Noted    Labor and delivery indication for care or intervention 01/20/2024    Anemia associated with acute blood loss 01/20/2024    Acute recurrent maxillary sinusitis  "01/25/2023    Depression 09/30/2020    Microcytic anemia 09/30/2020    IUP (intrauterine pregnancy), incidental 12/18/2019    Mild intermittent asthma 11/16/2015    Myopia of both eyes 11/16/2015         Health Maintenance: Completed    ROS:  Review of Systems   Constitutional:  Negative for chills and fever.   Respiratory:  Negative for shortness of breath.    Cardiovascular:  Negative for chest pain and palpitations.       Objective:     Exam:  /82 (BP Location: Right arm, Patient Position: Sitting, BP Cuff Size: Adult)   Pulse 93   Temp 36.1 °C (97 °F) (Temporal)   Ht 1.575 m (5' 2\")   Wt 88 kg (194 lb)   SpO2 98%   BMI 35.48 kg/m²  Body mass index is 35.48 kg/m².    Physical Exam  Constitutional:       Appearance: Normal appearance.   Eyes:      Extraocular Movements: Extraocular movements intact.   Abdominal:      General: Bowel sounds are normal.      Tenderness: There is abdominal tenderness in the epigastric area.   Neurological:      Mental Status: She is alert.   Psychiatric:         Mood and Affect: Mood normal.               Assessment & Plan:     24 y.o. female with the following -     1. Loose stools  Chronic, ongoing.  Patient reports having loose stools daily.  She reports she has these episodes in the morning and can be worse depending on what she has eaten.  Will obtain fecal calprotectin, CBC with differential, celiac disease.  FODMAP diet was discussed with the patient.  Patient's loose stool correlate with IBS diarrheal type.  Referral for GI has also been placed.  Trial Imodium 2 mg 45 minutes prior to each meal for the next 2 weeks.  I advised the patient to discontinue the medication if her symptoms resolved.  Patient verbalized understanding.  - CALPROTECTIN,FECAL; Future  - CBC WITH DIFFERENTIAL; Future  - CELIAC DISEASE AB PANEL; Future  - loperamide (IMODIUM) 2 MG Cap; Take 1 Capsule by mouth 4 times a day as needed for Diarrhea for up to 14 days.  Dispense: 56 Capsule; " Refill: 0    2. Gastroesophageal reflux disease, unspecified whether esophagitis present  Chronic, uncontrolled.  Patient reports that she has acid reflux that is worse at night.  She has tried pantoprazole in the past with no relief.  Will trial pantoprazole 40 mg.  I advised patient to take medication 1 hour prior to dinner.  Patient to continue to make dietary changes.  - pantoprazole (PROTONIX) 40 MG Tablet Delayed Response; Please take 1 tablet 1 hour prior to dinner  Dispense: 90 Tablet; Refill: 0  - Referral to Gastroenterology    3. Bloating  Chronic, ongoing.  FODMAP diet was discussed with the patient and handout was provided.            Return in about 2 weeks (around 4/3/2025) for Establish Care.    Please note that this dictation was created using voice recognition software. I have made every reasonable attempt to correct obvious errors, but I expect that there are errors of grammar and possibly content that I did not discover before finalizing the note.

## 2025-03-25 ENCOUNTER — PATIENT MESSAGE (OUTPATIENT)
Dept: MEDICAL GROUP | Facility: PHYSICIAN GROUP | Age: 25
End: 2025-03-25
Payer: COMMERCIAL

## 2025-03-25 DIAGNOSIS — R11.0 NAUSEA: ICD-10-CM

## 2025-03-25 RX ORDER — ONDANSETRON 4 MG/1
4 TABLET, FILM COATED ORAL EVERY 8 HOURS PRN
Qty: 30 TABLET | Refills: 0 | Status: SHIPPED | OUTPATIENT
Start: 2025-03-25 | End: 2025-04-04

## 2025-04-17 ENCOUNTER — HOSPITAL ENCOUNTER (OUTPATIENT)
Dept: LAB | Facility: MEDICAL CENTER | Age: 25
End: 2025-04-17
Attending: STUDENT IN AN ORGANIZED HEALTH CARE EDUCATION/TRAINING PROGRAM
Payer: COMMERCIAL

## 2025-04-17 DIAGNOSIS — R19.5 LOOSE STOOLS: ICD-10-CM

## 2025-04-17 LAB
BASOPHILS # BLD AUTO: 0.8 % (ref 0–1.8)
BASOPHILS # BLD: 0.04 K/UL (ref 0–0.12)
EOSINOPHIL # BLD AUTO: 0.14 K/UL (ref 0–0.51)
EOSINOPHIL NFR BLD: 2.6 % (ref 0–6.9)
ERYTHROCYTE [DISTWIDTH] IN BLOOD BY AUTOMATED COUNT: 42.9 FL (ref 35.9–50)
HCT VFR BLD AUTO: 41.2 % (ref 37–47)
HGB BLD-MCNC: 12.9 G/DL (ref 12–16)
IMM GRANULOCYTES # BLD AUTO: 0.01 K/UL (ref 0–0.11)
IMM GRANULOCYTES NFR BLD AUTO: 0.2 % (ref 0–0.9)
LYMPHOCYTES # BLD AUTO: 2.1 K/UL (ref 1–4.8)
LYMPHOCYTES NFR BLD: 39.5 % (ref 22–41)
MCH RBC QN AUTO: 24.7 PG (ref 27–33)
MCHC RBC AUTO-ENTMCNC: 31.3 G/DL (ref 32.2–35.5)
MCV RBC AUTO: 78.9 FL (ref 81.4–97.8)
MONOCYTES # BLD AUTO: 0.45 K/UL (ref 0–0.85)
MONOCYTES NFR BLD AUTO: 8.5 % (ref 0–13.4)
NEUTROPHILS # BLD AUTO: 2.58 K/UL (ref 1.82–7.42)
NEUTROPHILS NFR BLD: 48.4 % (ref 44–72)
NRBC # BLD AUTO: 0 K/UL
NRBC BLD-RTO: 0 /100 WBC (ref 0–0.2)
PLATELET # BLD AUTO: 388 K/UL (ref 164–446)
PMV BLD AUTO: 9.8 FL (ref 9–12.9)
RBC # BLD AUTO: 5.22 M/UL (ref 4.2–5.4)
WBC # BLD AUTO: 5.3 K/UL (ref 4.8–10.8)

## 2025-04-17 PROCEDURE — 36415 COLL VENOUS BLD VENIPUNCTURE: CPT

## 2025-04-17 PROCEDURE — 86364 TISS TRNSGLTMNASE EA IG CLAS: CPT

## 2025-04-17 PROCEDURE — 85025 COMPLETE CBC W/AUTO DIFF WBC: CPT

## 2025-04-18 ENCOUNTER — OFFICE VISIT (OUTPATIENT)
Dept: MEDICAL GROUP | Facility: PHYSICIAN GROUP | Age: 25
End: 2025-04-18
Payer: COMMERCIAL

## 2025-04-18 VITALS
WEIGHT: 194 LBS | SYSTOLIC BLOOD PRESSURE: 120 MMHG | BODY MASS INDEX: 35.7 KG/M2 | TEMPERATURE: 98.5 F | OXYGEN SATURATION: 98 % | HEIGHT: 62 IN | DIASTOLIC BLOOD PRESSURE: 80 MMHG | HEART RATE: 79 BPM

## 2025-04-18 DIAGNOSIS — R19.5 LOOSE STOOLS: ICD-10-CM

## 2025-04-18 DIAGNOSIS — R14.0 BLOATING: ICD-10-CM

## 2025-04-18 DIAGNOSIS — K21.9 GASTROESOPHAGEAL REFLUX DISEASE, UNSPECIFIED WHETHER ESOPHAGITIS PRESENT: ICD-10-CM

## 2025-04-18 DIAGNOSIS — R11.0 NAUSEA: ICD-10-CM

## 2025-04-18 DIAGNOSIS — D50.9 MICROCYTIC ANEMIA: ICD-10-CM

## 2025-04-18 PROBLEM — J01.01 ACUTE RECURRENT MAXILLARY SINUSITIS: Status: RESOLVED | Noted: 2023-01-25 | Resolved: 2025-04-18

## 2025-04-18 PROBLEM — D62 ANEMIA ASSOCIATED WITH ACUTE BLOOD LOSS: Status: RESOLVED | Noted: 2024-01-20 | Resolved: 2025-04-18

## 2025-04-18 LAB — TTG IGA SER IA-ACNC: 1.05 FLU (ref 0–4.99)

## 2025-04-18 PROCEDURE — 3074F SYST BP LT 130 MM HG: CPT | Performed by: STUDENT IN AN ORGANIZED HEALTH CARE EDUCATION/TRAINING PROGRAM

## 2025-04-18 PROCEDURE — 99214 OFFICE O/P EST MOD 30 MIN: CPT | Performed by: STUDENT IN AN ORGANIZED HEALTH CARE EDUCATION/TRAINING PROGRAM

## 2025-04-18 PROCEDURE — 3079F DIAST BP 80-89 MM HG: CPT | Performed by: STUDENT IN AN ORGANIZED HEALTH CARE EDUCATION/TRAINING PROGRAM

## 2025-04-18 RX ORDER — SIMETHICONE 80 MG
80 TABLET,CHEWABLE ORAL EVERY 6 HOURS PRN
Qty: 30 TABLET | Refills: 3 | Status: SHIPPED | OUTPATIENT
Start: 2025-04-18

## 2025-04-18 ASSESSMENT — PATIENT HEALTH QUESTIONNAIRE - PHQ9: CLINICAL INTERPRETATION OF PHQ2 SCORE: 0

## 2025-04-18 ASSESSMENT — ENCOUNTER SYMPTOMS
FEVER: 0
CHILLS: 0
PALPITATIONS: 0
SHORTNESS OF BREATH: 0

## 2025-04-18 ASSESSMENT — FIBROSIS 4 INDEX: FIB4 SCORE: 0.34

## 2025-04-18 NOTE — PROGRESS NOTES
"Subjective:   Verbal consent was acquired by the patient to use [employer] ambient listening note generation during this visit Yes     CC: Follow-up on labs    History of Present Illness  Ms. Bobo is a pleasant 25 yo who presents for evaluation of nausea, gas, and loose stools.    An increase in episodes of nausea and gagging is reported. On 04/16/2025, severe abdominal gas pain was experienced, which was unrelieved by stretching or standing up. Uncertainty exists regarding the relation of these symptoms to diet, as a salad was consumed at work that day. Post-dinner, gagging without emesis occurred. Adherence to a FODMAP diet in an attempt to lose weight has been successful, with weight decreasing from 198 to 193 pounds. A clean diet is maintained, avoiding greasy or spicy foods. An appointment with a gastroenterologist is scheduled for the end of May. Loose stools continue, but the stool test has not yet been completed. No vomiting at night due to heartburn is reported. Bloating remains unchanged, with a significant episode on 04/16/2025. Gas-X has not been taken. Pantoprazole has not significantly alleviated symptoms, but heartburn has not been experienced while lying down or during sleep. Abdominal pain is present during these episodes.      Patient Active Problem List    Diagnosis Date Noted    Depression 09/30/2020    Microcytic anemia 09/30/2020    Mild intermittent asthma 11/16/2015    Myopia of both eyes 11/16/2015         Health Maintenance: Not completed    ROS:  Review of Systems   Constitutional:  Negative for chills and fever.   Respiratory:  Negative for shortness of breath.    Cardiovascular:  Negative for chest pain and palpitations.       Objective:     Exam:  /80 (BP Location: Left arm, Patient Position: Sitting, BP Cuff Size: Adult)   Pulse 79   Temp 36.9 °C (98.5 °F) (Temporal)   Ht 1.575 m (5' 2\")   Wt 88 kg (194 lb)   SpO2 98%   BMI 35.48 kg/m²  Body mass index is 35.48 " kg/m².    Physical Exam  Constitutional:       Appearance: Normal appearance.   Eyes:      Extraocular Movements: Extraocular movements intact.   Neurological:      Mental Status: She is alert.   Psychiatric:         Mood and Affect: Mood normal.             Labs:    Latest Reference Range & Units 04/17/25 06:30   WBC 4.8 - 10.8 K/uL 5.3   RBC 4.20 - 5.40 M/uL 5.22   Hemoglobin 12.0 - 16.0 g/dL 12.9   Hematocrit 37.0 - 47.0 % 41.2   MCV 81.4 - 97.8 fL 78.9 (L)   MCH 27.0 - 33.0 pg 24.7 (L)   MCHC 32.2 - 35.5 g/dL 31.3 (L)   RDW 35.9 - 50.0 fL 42.9   Platelet Count 164 - 446 K/uL 388   MPV 9.0 - 12.9 fL 9.8   Neutrophils-Polys 44.00 - 72.00 % 48.40   Neutrophils (Absolute) 1.82 - 7.42 K/uL 2.58   Lymphocytes 22.00 - 41.00 % 39.50   Lymphs (Absolute) 1.00 - 4.80 K/uL 2.10   Monocytes 0.00 - 13.40 % 8.50   Monos (Absolute) 0.00 - 0.85 K/uL 0.45   Eosinophils 0.00 - 6.90 % 2.60   Eos (Absolute) 0.00 - 0.51 K/uL 0.14   Basophils 0.00 - 1.80 % 0.80   Baso (Absolute) 0.00 - 0.12 K/uL 0.04   Immature Granulocytes 0.00 - 0.90 % 0.20   Immature Granulocytes (abs) 0.00 - 0.11 K/uL 0.01   Nucleated RBC 0.00 - 0.20 /100 WBC 0.00   NRBC (Absolute) K/uL 0.00   (L): Data is abnormally low    Assessment & Plan:     24 y.o. female with the following -     1. Bloating  Chronic, ongoing.  Patient continues have bloating with flatulence.  She reports having extreme gas pain.  Will trial simethicone to help with the bloating and flatulence.  - simethicone (MYLICON) 80 MG Chew Tab; Chew 1 Tablet every 6 hours as needed for Flatulence.  Dispense: 30 Tablet; Refill: 3    2. Microcytic anemia  Chronic, ongoing.  MCV from 4/17/2025 was 78.  Will obtain iron and ferritin levels.  - IRON/TOTAL IRON BIND; Future  - FERRITIN; Future    3. Loose stools  Chronic, ongoing.  Patient has yet to complete fecal calprotectin.  She continues to have loose stools in spite of trying a FODMAP diet.  She is scheduled to see GI at the end of May.    4.  Gastroesophageal reflux disease, unspecified whether esophagitis present  Chronic, improving.  Patient reports nocturnal symptoms of GERD have improved since starting pantoprazole 40 mg nightly.  Patient to continue pantoprazole 40 mg daily.    5. Nausea  Chronic, ongoing.  Unclear etiology.  Could be related with bloating and GERD.  Patient is scheduled to see GI in 5/2025.          Return if symptoms worsen or fail to improve.    Please note that this dictation was created using voice recognition software. I have made every reasonable attempt to correct obvious errors, but I expect that there are errors of grammar and possibly content that I did not discover before finalizing the note.

## 2025-04-21 ENCOUNTER — RESULTS FOLLOW-UP (OUTPATIENT)
Dept: MEDICAL GROUP | Facility: PHYSICIAN GROUP | Age: 25
End: 2025-04-21

## 2025-05-07 ENCOUNTER — PATIENT MESSAGE (OUTPATIENT)
Dept: MEDICAL GROUP | Facility: PHYSICIAN GROUP | Age: 25
End: 2025-05-07
Payer: COMMERCIAL

## 2025-05-07 DIAGNOSIS — N89.8 VAGINAL DISCHARGE: ICD-10-CM

## 2025-05-09 ENCOUNTER — NON-PROVIDER VISIT (OUTPATIENT)
Dept: MEDICAL GROUP | Facility: PHYSICIAN GROUP | Age: 25
End: 2025-05-09
Payer: COMMERCIAL

## 2025-05-09 ENCOUNTER — HOSPITAL ENCOUNTER (OUTPATIENT)
Facility: MEDICAL CENTER | Age: 25
End: 2025-05-09
Attending: STUDENT IN AN ORGANIZED HEALTH CARE EDUCATION/TRAINING PROGRAM
Payer: COMMERCIAL

## 2025-05-09 DIAGNOSIS — N89.8 VAGINAL DISCHARGE: ICD-10-CM

## 2025-05-09 LAB
CANDIDA DNA VAG QL PROBE+SIG AMP: POSITIVE
G VAGINALIS DNA VAG QL PROBE+SIG AMP: POSITIVE
T VAGINALIS DNA VAG QL PROBE+SIG AMP: NEGATIVE

## 2025-05-09 PROCEDURE — 87480 CANDIDA DNA DIR PROBE: CPT

## 2025-05-09 PROCEDURE — 87510 GARDNER VAG DNA DIR PROBE: CPT

## 2025-05-09 PROCEDURE — 87660 TRICHOMONAS VAGIN DIR PROBE: CPT

## 2025-05-09 NOTE — PROGRESS NOTES
Constantin Ortiz Jihan is a 24 y.o. female here for a non-provider visit for Vag path swab    If abnormal was an in office provider notified today (if so, indicate provider)? Yes    Routed to PCP? Yes

## 2025-05-12 ENCOUNTER — RESULTS FOLLOW-UP (OUTPATIENT)
Dept: MEDICAL GROUP | Facility: PHYSICIAN GROUP | Age: 25
End: 2025-05-12
Payer: COMMERCIAL

## 2025-05-12 DIAGNOSIS — B96.89 BACTERIAL VAGINOSIS: ICD-10-CM

## 2025-05-12 DIAGNOSIS — B37.9 YEAST INFECTION: ICD-10-CM

## 2025-05-12 DIAGNOSIS — N76.0 BACTERIAL VAGINOSIS: ICD-10-CM

## 2025-05-12 RX ORDER — FLUCONAZOLE 100 MG/1
TABLET ORAL
Qty: 2 TABLET | Refills: 0 | Status: SHIPPED | OUTPATIENT
Start: 2025-05-12

## 2025-05-12 RX ORDER — METRONIDAZOLE 500 MG/1
500 TABLET ORAL 2 TIMES DAILY
Qty: 14 TABLET | Refills: 0 | Status: SHIPPED | OUTPATIENT
Start: 2025-05-12 | End: 2025-05-19

## 2025-05-23 ENCOUNTER — HOSPITAL ENCOUNTER (OUTPATIENT)
Facility: MEDICAL CENTER | Age: 25
End: 2025-05-23
Attending: STUDENT IN AN ORGANIZED HEALTH CARE EDUCATION/TRAINING PROGRAM
Payer: COMMERCIAL

## 2025-05-23 ENCOUNTER — OFFICE VISIT (OUTPATIENT)
Dept: MEDICAL GROUP | Facility: PHYSICIAN GROUP | Age: 25
End: 2025-05-23
Payer: COMMERCIAL

## 2025-05-23 VITALS
TEMPERATURE: 97.9 F | WEIGHT: 191 LBS | BODY MASS INDEX: 35.15 KG/M2 | HEART RATE: 101 BPM | HEIGHT: 62 IN | OXYGEN SATURATION: 98 % | DIASTOLIC BLOOD PRESSURE: 80 MMHG | SYSTOLIC BLOOD PRESSURE: 114 MMHG

## 2025-05-23 DIAGNOSIS — B37.0 THRUSH, ORAL: Primary | ICD-10-CM

## 2025-05-23 DIAGNOSIS — B37.0 THRUSH, ORAL: ICD-10-CM

## 2025-05-23 PROCEDURE — 3074F SYST BP LT 130 MM HG: CPT | Performed by: STUDENT IN AN ORGANIZED HEALTH CARE EDUCATION/TRAINING PROGRAM

## 2025-05-23 PROCEDURE — 3079F DIAST BP 80-89 MM HG: CPT | Performed by: STUDENT IN AN ORGANIZED HEALTH CARE EDUCATION/TRAINING PROGRAM

## 2025-05-23 PROCEDURE — 99213 OFFICE O/P EST LOW 20 MIN: CPT | Performed by: STUDENT IN AN ORGANIZED HEALTH CARE EDUCATION/TRAINING PROGRAM

## 2025-05-23 PROCEDURE — 87102 FUNGUS ISOLATION CULTURE: CPT

## 2025-05-23 PROCEDURE — 87205 SMEAR GRAM STAIN: CPT

## 2025-05-23 RX ORDER — NYSTATIN 100000 [USP'U]/ML
SUSPENSION ORAL
Qty: 473 ML | Refills: 0 | Status: SHIPPED | OUTPATIENT
Start: 2025-05-23

## 2025-05-23 ASSESSMENT — ENCOUNTER SYMPTOMS
PALPITATIONS: 0
FEVER: 0
CHILLS: 0
SHORTNESS OF BREATH: 0

## 2025-05-23 ASSESSMENT — FIBROSIS 4 INDEX: FIB4 SCORE: 0.34

## 2025-05-23 NOTE — PROGRESS NOTES
"Subjective:   Verbal consent was acquired by the patient to use [employer] ambient listening note generation during this visit Yes     CC: Thrush    History of Present Illness  Ms. Bobo is a pleasant 23 yo who presents for evaluation of an unusual sensation on her tongue.    She reports a persistent sensation on her tongue, initially perceived as a tingling feeling, which has been present for approximately 4 to 5 days. This sensation was initially attributed to a potential food-related cause. Currently, she describes the sensation as akin to having a foreign object on her tongue or a feeling of uncleanliness. She does not experience any associated symptoms such as fever, chills, or oral pain. This is her first encounter with such a symptom. She has not introduced any new foods into her diet recently. She expresses concern about the potential contagiousness of thrush.     She was prescribed metronidazole and an antifungal medication, which she had not previously taken. The unusual tongue sensation began a few days after starting these medications, coinciding with the completion of her antibiotic course. She recalls being prescribed antibiotics and antifungal medications on 05/19/2025 for bacterial vaginosis and a yeast infection, both of which have since resolved.      Patient Active Problem List    Diagnosis Date Noted    Depression 09/30/2020    Microcytic anemia 09/30/2020    Mild intermittent asthma 11/16/2015    Myopia of both eyes 11/16/2015       Health Maintenance: Not completed    ROS:  Review of Systems   Constitutional:  Negative for chills and fever.   Respiratory:  Negative for shortness of breath.    Cardiovascular:  Negative for chest pain and palpitations.       Objective:     Exam:  /80 (BP Location: Left arm, Patient Position: Sitting, BP Cuff Size: Adult)   Pulse (!) 101   Temp 36.6 °C (97.9 °F) (Temporal)   Ht 1.575 m (5' 2\")   Wt 86.6 kg (191 lb)   SpO2 98%   BMI 34.93 kg/m²  Body " mass index is 34.93 kg/m².    Physical Exam  Constitutional:       Appearance: Normal appearance.   HENT:      Mouth/Throat:      Comments: Thick white coating noted on tongue  Eyes:      Extraocular Movements: Extraocular movements intact.   Neurological:      Mental Status: She is alert.   Psychiatric:         Mood and Affect: Mood normal.                 Assessment & Plan:     24 y.o. female with the following -     1. Thrush, oral (Primary)  Acute, ongoing. The condition is likely a result of the recent antibiotic treatment with metronidazole, which can lead to oral thrush. A culture will be obtained to confirm the diagnosis of a fungal infection. A prescription for nystatin mouthwash has been provided, with instructions to use it 4 times daily for a minimum of 7 days. Advised to maintain regular dental hygiene practices. Start the medication immediately and send a message on Wednesday to report if the treatment is effective. The prescription has been sent to the pharmacy.  - Fungal Culture; Future  - nystatin (MYCOSTATIN) 420895 UNIT/ML Suspension; 400,000 to 600,000 units 4 times daily; swish in the mouth and retain for as long as possible (several minutes) before swallowing for seven days  Dispense: 473 mL; Refill: 0              Return if symptoms worsen or fail to improve.    Please note that this dictation was created using voice recognition software. I have made every reasonable attempt to correct obvious errors, but I expect that there are errors of grammar and possibly content that I did not discover before finalizing the note.

## 2025-05-24 LAB
FUNGUS SPEC FUNGUS STN: NORMAL
SIGNIFICANT IND 70042: NORMAL
SITE SITE: NORMAL
SOURCE SOURCE: NORMAL

## 2025-05-28 ENCOUNTER — RESULTS FOLLOW-UP (OUTPATIENT)
Dept: MEDICAL GROUP | Facility: PHYSICIAN GROUP | Age: 25
End: 2025-05-28

## 2025-05-29 LAB
FUNGUS SPEC CULT: NORMAL
FUNGUS SPEC FUNGUS STN: NORMAL
SIGNIFICANT IND 70042: NORMAL
SITE SITE: NORMAL
SOURCE SOURCE: NORMAL

## 2025-06-20 DIAGNOSIS — K21.9 GASTROESOPHAGEAL REFLUX DISEASE, UNSPECIFIED WHETHER ESOPHAGITIS PRESENT: ICD-10-CM

## 2025-06-20 RX ORDER — PANTOPRAZOLE SODIUM 40 MG/1
TABLET, DELAYED RELEASE ORAL
Qty: 90 TABLET | Refills: 0 | Status: SHIPPED | OUTPATIENT
Start: 2025-06-20

## 2025-08-19 ENCOUNTER — OFFICE VISIT (OUTPATIENT)
Facility: MEDICAL CENTER | Age: 25
End: 2025-08-19
Payer: COMMERCIAL

## 2025-08-19 VITALS
DIASTOLIC BLOOD PRESSURE: 70 MMHG | BODY MASS INDEX: 33.67 KG/M2 | OXYGEN SATURATION: 97 % | SYSTOLIC BLOOD PRESSURE: 120 MMHG | WEIGHT: 182.98 LBS | HEART RATE: 87 BPM | TEMPERATURE: 98.4 F | HEIGHT: 62 IN

## 2025-08-19 DIAGNOSIS — K21.00 GASTROESOPHAGEAL REFLUX DISEASE WITH ESOPHAGITIS WITHOUT HEMORRHAGE: ICD-10-CM

## 2025-08-19 DIAGNOSIS — R11.14 BILIOUS VOMITING WITH NAUSEA: Primary | ICD-10-CM

## 2025-08-19 DIAGNOSIS — R10.13 EPIGASTRIC PAIN: ICD-10-CM

## 2025-08-19 DIAGNOSIS — R14.0 BLOATING: ICD-10-CM

## 2025-08-19 DIAGNOSIS — K44.9 HIATAL HERNIA: ICD-10-CM

## 2025-08-19 ASSESSMENT — ENCOUNTER SYMPTOMS
NAUSEA: 1
VOMITING: 1
ABDOMINAL PAIN: 1

## 2025-08-19 ASSESSMENT — FIBROSIS 4 INDEX: FIB4 SCORE: 0.36

## 2025-09-03 ENCOUNTER — APPOINTMENT (OUTPATIENT)
Dept: RADIOLOGY | Facility: MEDICAL CENTER | Age: 25
End: 2025-09-03
Attending: SURGERY
Payer: COMMERCIAL